# Patient Record
Sex: FEMALE | Race: WHITE | NOT HISPANIC OR LATINO | Employment: PART TIME | URBAN - METROPOLITAN AREA
[De-identification: names, ages, dates, MRNs, and addresses within clinical notes are randomized per-mention and may not be internally consistent; named-entity substitution may affect disease eponyms.]

---

## 2017-04-24 ENCOUNTER — THERAPY VISIT (OUTPATIENT)
Dept: PHYSICAL THERAPY | Facility: CLINIC | Age: 47
End: 2017-04-24
Payer: COMMERCIAL

## 2017-04-24 DIAGNOSIS — M25.511 ACUTE PAIN OF RIGHT SHOULDER: Primary | ICD-10-CM

## 2017-04-24 PROCEDURE — 97112 NEUROMUSCULAR REEDUCATION: CPT | Mod: GP

## 2017-04-24 PROCEDURE — 97110 THERAPEUTIC EXERCISES: CPT | Mod: GP

## 2017-04-24 PROCEDURE — 97161 PT EVAL LOW COMPLEX 20 MIN: CPT | Mod: GP

## 2017-04-24 NOTE — MR AVS SNAPSHOT
"              After Visit Summary   4/24/2017    Chanelle Allen    MRN: [id number]           Patient Information     Date Of Birth          1970        Visit Information        Provider Department      4/24/2017 3:40 PM Bryan Bloom PT [facility] [place] [facility]        Today's Diagnoses     Acute pain of right shoulder    -  1       Follow-ups after your visit        Your next 10 appointments already scheduled     May 01, 2017  2:20 PM CDT   OLIVA Extremity with Bryan Bloom PT   [facility] ([facility] [place])    [address] [place] [address]  [address]  [place] [id number] [phone]              Who to contact     If you have questions or need follow up information about today's clinic visit or your schedule please contact [facility] directly at [phone].  Normal or non-critical lab and imaging results will be communicated to you by TapTrakhart, letter or phone within 4 business days after the clinic has received the results. If you do not hear from us within 7 days, please contact the clinic through TapTrakhart or phone. If you have a critical or abnormal lab result, we will notify you by phone as soon as possible.  Submit refill requests through [employer] or call your pharmacy and they will forward the refill request to us. Please allow 3 business days for your refill to be completed.          Additional Information About Your Visit        TapTrakhart Information     [employer] lets you send messages to your doctor, view your test results, renew your prescriptions, schedule appointments and more. To sign up, go to [email or web address] . Click on \"Log in\" on the left side of the screen, which will take you to the Welcome page. Then click on \"Sign up Now\" on the right side of the page.     You will be asked to enter the access code listed below, as well as some personal information. " Please follow the directions to create your username and password.     Your access code is: [email or web address]  Expires: 2017  5:09 PM     Your access code will  in 90 days. If you need help or a new code, please call your [place] clinic or [phone].        Care EveryWhere ID     This is your Care EveryWhere ID. This could be used by other organizations to access your [place] medical records  [id number]         Blood Pressure from Last 3 Encounters:   No data found for BP    Weight from Last 3 Encounters:   No data found for Wt              We Performed the Following     HC PT EVAL, LOW COMPLEXITY     OLIVA INITIAL EVAL REPORT     NEUROMUSCULAR RE-EDUCATION     THERAPEUTIC EXERCISES        Primary Care Provider Office Phone # Fax #    Bahman Duran [phone]       [place] [address] [address] [facility] [address]  [facility] [address]        Thank you!     Thank you for choosing [facility]  for your care. Our goal is always to provide you with excellent care. Hearing back from our patients is one way we can continue to improve our services. Please take a few minutes to complete the written survey that you may receive in the mail after your visit with us. Thank you!             Your Updated Medication List - Protect others around you: Learn how to safely use, store and throw away your medicines at www.disposemymeds.org.      Notice  As of 2017  5:09 PM    You have not been prescribed any medications.

## 2017-04-24 NOTE — LETTER
[facility] [place] [address] [facility]  [address]  [facility] [address] [phone]    2017    Re: Chanelle Allen   :  1970  MRN:  [id number]   REFERRING PHYSICIAN:   REHAN Martin    [facility]    Date of Initial Evaluation:  17  Visits:  Rxs Used: 1  Reason for Referral:  Acute pain of right shoulder    EVALUATION SUMMARY    Subjective:  Patient is a 47 year old female presenting with rehab right shoulder hpi.   Chanelle Allen is a 47 year old female with a right shoulder condition.  Condition occurred with:  Other.  Condition occurred: other.  This is a new condition  Onset: years ago- thinks it might be due to wear and tear. Patient reports pain:  In the joint.  Radiates to:  Upper arm, elbow and lower arm.  Pain is described as sharp, shooting, stabbing and aching and is constant and reported as 6/10.  Associated symptoms:  Loss of motion/stiffness and loss of strength. Worse during: time of day does not affect sx.  Symptoms are exacerbated by certain positions, lying on extremity, using arm overhead, using arm at shoulder level, lifting and carrying and relieved by nothing.  Since onset symptoms are gradually worsening.  Special tests:  MRI.  Pertinent medical history includes:  Osteoarthritis, smoking and menopausal (Past smoker, numbness/tingling, pain at night/rest, weakness).  Medical allergies: yes (Latex, sulfa, penicillan).  Other surgeries include:  Orthopedic surgery (2 rt ACL 2012, 1L ACL ).  Current medications:  Muscle relaxants and pain medication (spironolactin).  Current occupation is Lunch lady.  Primary job tasks include:  Prolonged standing, lifting and repetitive tasks (Pusing/pulling).              Objective:  Standing Alignment:    Shoulder/UE:  Rounded shoulders  Flexibility/Screens:   Negative screens: Cervical      Shoulder  Evaluation:  ROM:  AROM:    Flexion:  Right:  150  Abduction:  Right:  125  Internal Rotation:  Right:  40  External Rotation:  Right:  100    Re: Chanelle Allen   :  1970    Strength:    Flexion: Right: 4-/5     Pain:   Abduction:  Right: 4+/5     Pain:  Internal Rotation:  Right: 4+/5     Pain:  External Rotation:   Right:3+/5     Pain:    Special Tests:  Special tests assessed shoulder: Inconclusive RCT.  Right shoulder positive for the following special tests:Impingement  Palpation:    Right shoulder tenderness present at: Supraspinatus  Mobility Tests:    Scapulohumeral rhythm right:  Hypomobile     General   ROS    Assessment/Plan:    Patient is a 47 year old female with right side shoulder complaints.    Patient has the following significant findings with corresponding treatment plan.                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    Comorbidity factors that impact the plan of care are:      None.     Medications impacting care: Muscle relaxant.  2) Examination of Body Systems comprised of:   Body structures and functions that impact the plan of care:      Shoulder.   Activity limitations that impact the plan of care are:      Cooking, Dressing, Grasping and reaching .  3) Clinical presentation characteristics are:   Stable/Uncomplicated.  4) Decision-Making    Low complexity using standardized patient assessment instrument and/or                              measureable assessment of functional outcome.  Cumulative Therapy Evaluation is: Low  complexity.    Previous and current functional limitations:  (See Goal Flow Sheet for this information)    Short term and Long term goals: (See Goal Flow Sheet for this information)   Communication ability:      Re: Chanelle Allen   :  1970    Treatment Explanation - The following has been discussed with the patient:   RX ordered/plan of care  Anticipated outcomes  Possible risks and side effects  This patient would benefit from PT intervention to resume normal activities.   Rehab potential is good.  Frequency:  1 X week, once daily  Duration:  for 6-8 weeks  Discharge Plan:  Achieve all LTG.  Independent in home treatment program.  Reach maximal therapeutic benefit.    Thank you for your referral.    INQUIRIES  Therapist: Bryan Bloom, PT   [facility]  [address] [place] [address]  [address]  [place] [address]  Phone: [phone]  Fax: [phone]

## 2017-04-24 NOTE — PROGRESS NOTES
Subjective:    Patient is a 47 year old female presenting with rehab right shoulder hpi.   Chanelle Allen is a 47 year old female with a right shoulder condition.  Condition occurred with:  Other.  Condition occurred: other.  This is a new condition  Onset: years ago- thinks it might be due to wear and tear. .    Patient reports pain:  In the joint.  Radiates to:  Upper arm, elbow and lower arm.  Pain is described as sharp, shooting, stabbing and aching and is constant and reported as 6/10.  Associated symptoms:  Loss of motion/stiffness and loss of strength. Worse during: time of day does not affect sx.  Symptoms are exacerbated by certain positions, lying on extremity, using arm overhead, using arm at shoulder level, lifting and carrying and relieved by nothing.  Since onset symptoms are gradually worsening.  Special tests:  MRI.                                                    Objective:    Standing Alignment:      Shoulder/UE:  Rounded shoulders                  Flexibility/Screens:   Negative screens: Cervical                              Shoulder Evaluation:  ROM:  AROM:    Flexion:  Right:  150    Abduction:  Right:  125    Internal Rotation:  Right:  40  External Rotation:  Right:  100                      Strength:    Flexion: Right: 4-/5     Pain:     Abduction:  Right: 4+/5     Pain:    Internal Rotation:  Right: 4+/5     Pain:  External Rotation:   Right:3+/5     Pain:              Special Tests:  Special tests assessed shoulder: Inconclusive RCT.    Right shoulder positive for the following special tests:Impingement  Palpation:      Right shoulder tenderness present at: Supraspinatus  Mobility Tests:                Scapulohumeral rhythm right:  Hypomobile                                   General     ROS    Assessment/Plan:      Patient is a 47 year old female with right side shoulder complaints.    Patient has the following significant findings with corresponding treatment plan.                 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    Comorbidity factors that impact the plan of care are:      None.     Medications impacting care: Muscle relaxant.  2) Examination of Body Systems comprised of:   Body structures and functions that impact the plan of care:      Shoulder.   Activity limitations that impact the plan of care are:      Cooking, Dressing, Grasping and reaching .  3) Clinical presentation characteristics are:   Stable/Uncomplicated.  4) Decision-Making    Low complexity using standardized patient assessment instrument and/or measureable assessment of functional outcome.  Cumulative Therapy Evaluation is: Low complexity.    Previous and current functional limitations:  (See Goal Flow Sheet for this information)    Short term and Long term goals: (See Goal Flow Sheet for this information)     Communication ability:      Treatment Explanation - The following has been discussed with the patient:   RX ordered/plan of care  Anticipated outcomes  Possible risks and side effects  This patient would benefit from PT intervention to resume normal activities.   Rehab potential is good.    Frequency:  1 X week, once daily  Duration:  for 6-8 weeks  Discharge Plan:  Achieve all LTG.  Independent in home treatment program.  Reach maximal therapeutic benefit.    Please refer to the daily flowsheet for treatment today, total treatment time and time spent performing 1:1 timed codes.

## 2017-04-25 NOTE — PROGRESS NOTES
Subjective:    Patient is a 47 year old female presenting with rehab left ankle/foot hpi.                                      Pertinent medical history includes:  Osteoarthritis, smoking and menopausal (Past smoker, numbness/tingling, pain at night/rest, weakness).  Medical allergies: yes (Latex, sulfa, penicillan).  Other surgeries include:  Orthopedic surgery (2 rt ACL 2012 1999, 1L ACL 1997).  Current medications:  Muscle relaxants and pain medication (spironolactin).  Current occupation is Lunch lady  .    Primary job tasks include:  Prolonged standing, lifting and repetitive tasks (Pusing/pulling).                                Objective:    System    Physical Exam    General     ROS    Assessment/Plan:

## 2017-05-01 ENCOUNTER — THERAPY VISIT (OUTPATIENT)
Dept: PHYSICAL THERAPY | Facility: CLINIC | Age: 47
End: 2017-05-01
Payer: COMMERCIAL

## 2017-05-01 DIAGNOSIS — M25.511 ACUTE PAIN OF RIGHT SHOULDER: ICD-10-CM

## 2017-05-01 PROCEDURE — 97112 NEUROMUSCULAR REEDUCATION: CPT | Mod: GP

## 2017-05-01 PROCEDURE — 97110 THERAPEUTIC EXERCISES: CPT | Mod: GP

## 2017-06-15 ENCOUNTER — TRANSFERRED RECORDS (OUTPATIENT)
Dept: HEALTH INFORMATION MANAGEMENT | Facility: CLINIC | Age: 47
End: 2017-06-15

## 2017-06-20 RX ORDER — SPIRONOLACTONE 50 MG/1
100 TABLET, FILM COATED ORAL EVERY MORNING
COMMUNITY

## 2017-06-23 ENCOUNTER — HOSPITAL ENCOUNTER (INPATIENT)
Facility: CLINIC | Age: 47
LOS: 2 days | Discharge: HOME | DRG: 470 | End: 2017-06-25
Attending: ORTHOPAEDIC SURGERY | Admitting: ORTHOPAEDIC SURGERY
Payer: COMMERCIAL

## 2017-06-23 ENCOUNTER — APPOINTMENT (OUTPATIENT)
Dept: PHYSICAL THERAPY | Facility: CLINIC | Age: 47
DRG: 470 | End: 2017-06-23
Attending: ORTHOPAEDIC SURGERY
Payer: COMMERCIAL

## 2017-06-23 ENCOUNTER — ANESTHESIA EVENT (OUTPATIENT)
Dept: SURGERY | Facility: CLINIC | Age: 47
DRG: 470 | End: 2017-06-23
Payer: COMMERCIAL

## 2017-06-23 ENCOUNTER — ANESTHESIA (OUTPATIENT)
Dept: SURGERY | Facility: CLINIC | Age: 47
DRG: 470 | End: 2017-06-23
Payer: COMMERCIAL

## 2017-06-23 ENCOUNTER — APPOINTMENT (OUTPATIENT)
Dept: GENERAL RADIOLOGY | Facility: CLINIC | Age: 47
DRG: 470 | End: 2017-06-23
Attending: ORTHOPAEDIC SURGERY
Payer: COMMERCIAL

## 2017-06-23 DIAGNOSIS — M17.32 POST-TRAUMATIC OSTEOARTHRITIS OF LEFT KNEE: Primary | ICD-10-CM

## 2017-06-23 PROBLEM — M17.12 LEFT KNEE DJD: Status: ACTIVE | Noted: 2017-06-23

## 2017-06-23 PROCEDURE — 25000132 ZZH RX MED GY IP 250 OP 250 PS 637: Performed by: ORTHOPAEDIC SURGERY

## 2017-06-23 PROCEDURE — 27210794 ZZH OR GENERAL SUPPLY STERILE: Performed by: ORTHOPAEDIC SURGERY

## 2017-06-23 PROCEDURE — 27211024 ZZHC OR SUPPLY OTHER OPNP: Performed by: ORTHOPAEDIC SURGERY

## 2017-06-23 PROCEDURE — C1776 JOINT DEVICE (IMPLANTABLE): HCPCS | Performed by: ORTHOPAEDIC SURGERY

## 2017-06-23 PROCEDURE — 25800025 ZZH RX 258: Performed by: ORTHOPAEDIC SURGERY

## 2017-06-23 PROCEDURE — 27810169 ZZH OR IMPLANT GENERAL: Performed by: ORTHOPAEDIC SURGERY

## 2017-06-23 PROCEDURE — 25000128 H RX IP 250 OP 636: Performed by: PHYSICIAN ASSISTANT

## 2017-06-23 PROCEDURE — 97161 PT EVAL LOW COMPLEX 20 MIN: CPT | Mod: GP | Performed by: PHYSICAL THERAPIST

## 2017-06-23 PROCEDURE — 0MBP0ZZ EXCISION OF LEFT KNEE BURSA AND LIGAMENT, OPEN APPROACH: ICD-10-PCS | Performed by: ORTHOPAEDIC SURGERY

## 2017-06-23 PROCEDURE — 0QPC04Z REMOVAL OF INTERNAL FIXATION DEVICE FROM LEFT LOWER FEMUR, OPEN APPROACH: ICD-10-PCS | Performed by: ORTHOPAEDIC SURGERY

## 2017-06-23 PROCEDURE — 25000128 H RX IP 250 OP 636: Performed by: NURSE ANESTHETIST, CERTIFIED REGISTERED

## 2017-06-23 PROCEDURE — 25000132 ZZH RX MED GY IP 250 OP 250 PS 637: Performed by: PHYSICIAN ASSISTANT

## 2017-06-23 PROCEDURE — 71000015 ZZH RECOVERY PHASE 1 LEVEL 2 EA ADDTL HR: Performed by: ORTHOPAEDIC SURGERY

## 2017-06-23 PROCEDURE — 97110 THERAPEUTIC EXERCISES: CPT | Mod: GP | Performed by: PHYSICAL THERAPIST

## 2017-06-23 PROCEDURE — 97530 THERAPEUTIC ACTIVITIES: CPT | Mod: GP | Performed by: PHYSICAL THERAPIST

## 2017-06-23 PROCEDURE — 0SRD0J9 REPLACEMENT OF LEFT KNEE JOINT WITH SYNTHETIC SUBSTITUTE, CEMENTED, OPEN APPROACH: ICD-10-PCS | Performed by: ORTHOPAEDIC SURGERY

## 2017-06-23 PROCEDURE — 25000128 H RX IP 250 OP 636: Performed by: ORTHOPAEDIC SURGERY

## 2017-06-23 PROCEDURE — 25000125 ZZHC RX 250: Performed by: ANESTHESIOLOGY

## 2017-06-23 PROCEDURE — 27210995 ZZH RX 272: Performed by: ORTHOPAEDIC SURGERY

## 2017-06-23 PROCEDURE — S0077 INJECTION, CLINDAMYCIN PHOSP: HCPCS | Performed by: PHYSICIAN ASSISTANT

## 2017-06-23 PROCEDURE — 36000063 ZZH SURGERY LEVEL 4 EA 15 ADDTL MIN: Performed by: ORTHOPAEDIC SURGERY

## 2017-06-23 PROCEDURE — 40000193 ZZH STATISTIC PT WARD VISIT: Performed by: PHYSICAL THERAPIST

## 2017-06-23 PROCEDURE — 36000093 ZZH SURGERY LEVEL 4 1ST 30 MIN: Performed by: ORTHOPAEDIC SURGERY

## 2017-06-23 PROCEDURE — 25000125 ZZHC RX 250: Performed by: NURSE ANESTHETIST, CERTIFIED REGISTERED

## 2017-06-23 PROCEDURE — 12000007 ZZH R&B INTERMEDIATE

## 2017-06-23 PROCEDURE — 25000566 ZZH SEVOFLURANE, EA 15 MIN: Performed by: ORTHOPAEDIC SURGERY

## 2017-06-23 PROCEDURE — 99207 ZZC NON-BILLABLE SERV PER CHARTING: CPT | Performed by: PHYSICIAN ASSISTANT

## 2017-06-23 PROCEDURE — 37000009 ZZH ANESTHESIA TECHNICAL FEE, EACH ADDTL 15 MIN: Performed by: ORTHOPAEDIC SURGERY

## 2017-06-23 PROCEDURE — 27110028 ZZH OR GENERAL SUPPLY NON-STERILE: Performed by: ORTHOPAEDIC SURGERY

## 2017-06-23 PROCEDURE — 25000125 ZZHC RX 250: Performed by: ORTHOPAEDIC SURGERY

## 2017-06-23 PROCEDURE — 25000128 H RX IP 250 OP 636: Performed by: ANESTHESIOLOGY

## 2017-06-23 PROCEDURE — 37000008 ZZH ANESTHESIA TECHNICAL FEE, 1ST 30 MIN: Performed by: ORTHOPAEDIC SURGERY

## 2017-06-23 PROCEDURE — 0QPH04Z REMOVAL OF INTERNAL FIXATION DEVICE FROM LEFT TIBIA, OPEN APPROACH: ICD-10-PCS | Performed by: ORTHOPAEDIC SURGERY

## 2017-06-23 PROCEDURE — 40000986 XR KNEE PORT LT 1/2 VW: Mod: LT

## 2017-06-23 PROCEDURE — 40000170 ZZH STATISTIC PRE-PROCEDURE ASSESSMENT II: Performed by: ORTHOPAEDIC SURGERY

## 2017-06-23 PROCEDURE — S0077 INJECTION, CLINDAMYCIN PHOSP: HCPCS | Performed by: ORTHOPAEDIC SURGERY

## 2017-06-23 PROCEDURE — 71000014 ZZH RECOVERY PHASE 1 LEVEL 2 FIRST HR: Performed by: ORTHOPAEDIC SURGERY

## 2017-06-23 PROCEDURE — 25000125 ZZHC RX 250: Performed by: PHYSICIAN ASSISTANT

## 2017-06-23 DEVICE — IMPLANTABLE DEVICE: Type: IMPLANTABLE DEVICE | Site: KNEE | Status: FUNCTIONAL

## 2017-06-23 DEVICE — BONE CEMENT SIMPLEX FULL DOSE 6191-1-001: Type: IMPLANTABLE DEVICE | Site: KNEE | Status: FUNCTIONAL

## 2017-06-23 DEVICE — IMP BASEPLATE TIBIAL GENESIS II SZ 4 LT TI 71420166: Type: IMPLANTABLE DEVICE | Site: KNEE | Status: FUNCTIONAL

## 2017-06-23 DEVICE — IMP INSERT TIB S&N LGN PS HI FLEX XLPE SZ3-4 9MM 71453211: Type: IMPLANTABLE DEVICE | Site: KNEE | Status: FUNCTIONAL

## 2017-06-23 RX ORDER — ONDANSETRON 4 MG/1
4 TABLET, ORALLY DISINTEGRATING ORAL EVERY 30 MIN PRN
Status: DISCONTINUED | OUTPATIENT
Start: 2017-06-23 | End: 2017-06-23 | Stop reason: HOSPADM

## 2017-06-23 RX ORDER — ROPIVACAINE HYDROCHLORIDE 5 MG/ML
INJECTION, SOLUTION EPIDURAL; INFILTRATION; PERINEURAL PRN
Status: DISCONTINUED | OUTPATIENT
Start: 2017-06-23 | End: 2017-06-23

## 2017-06-23 RX ORDER — MEPERIDINE HYDROCHLORIDE 25 MG/ML
12.5 INJECTION INTRAMUSCULAR; INTRAVENOUS; SUBCUTANEOUS EVERY 5 MIN PRN
Status: DISCONTINUED | OUTPATIENT
Start: 2017-06-23 | End: 2017-06-23 | Stop reason: HOSPADM

## 2017-06-23 RX ORDER — AMOXICILLIN 250 MG
1-2 CAPSULE ORAL 2 TIMES DAILY
Status: DISCONTINUED | OUTPATIENT
Start: 2017-06-23 | End: 2017-06-25 | Stop reason: HOSPADM

## 2017-06-23 RX ORDER — ACETAMINOPHEN 500 MG
1000 TABLET ORAL
Status: COMPLETED | OUTPATIENT
Start: 2017-06-23 | End: 2017-06-23

## 2017-06-23 RX ORDER — KETOROLAC TROMETHAMINE 30 MG/ML
INJECTION, SOLUTION INTRAMUSCULAR; INTRAVENOUS PRN
Status: DISCONTINUED | OUTPATIENT
Start: 2017-06-23 | End: 2017-06-23 | Stop reason: HOSPADM

## 2017-06-23 RX ORDER — HYDROXYZINE HYDROCHLORIDE 50 MG/ML
50 INJECTION, SOLUTION INTRAMUSCULAR ONCE
Status: DISCONTINUED | OUTPATIENT
Start: 2017-06-23 | End: 2017-06-23

## 2017-06-23 RX ORDER — HYDROXYZINE HYDROCHLORIDE 25 MG/ML
25 INJECTION, SOLUTION INTRAMUSCULAR ONCE
Status: DISCONTINUED | OUTPATIENT
Start: 2017-06-23 | End: 2017-06-23

## 2017-06-23 RX ORDER — ALBUTEROL SULFATE 0.83 MG/ML
2.5 SOLUTION RESPIRATORY (INHALATION) EVERY 4 HOURS PRN
Status: DISCONTINUED | OUTPATIENT
Start: 2017-06-23 | End: 2017-06-23 | Stop reason: HOSPADM

## 2017-06-23 RX ORDER — BUPIVACAINE HYDROCHLORIDE AND EPINEPHRINE 2.5; 5 MG/ML; UG/ML
INJECTION, SOLUTION INFILTRATION; PERINEURAL PRN
Status: DISCONTINUED | OUTPATIENT
Start: 2017-06-23 | End: 2017-06-23 | Stop reason: HOSPADM

## 2017-06-23 RX ORDER — ONDANSETRON 2 MG/ML
4 INJECTION INTRAMUSCULAR; INTRAVENOUS EVERY 6 HOURS PRN
Status: DISCONTINUED | OUTPATIENT
Start: 2017-06-23 | End: 2017-06-25 | Stop reason: HOSPADM

## 2017-06-23 RX ORDER — HYDROXYZINE HYDROCHLORIDE 25 MG/ML
50 INJECTION, SOLUTION INTRAMUSCULAR ONCE
Status: DISCONTINUED | OUTPATIENT
Start: 2017-06-23 | End: 2017-06-23 | Stop reason: CLARIF

## 2017-06-23 RX ORDER — ONDANSETRON 2 MG/ML
4 INJECTION INTRAMUSCULAR; INTRAVENOUS EVERY 30 MIN PRN
Status: DISCONTINUED | OUTPATIENT
Start: 2017-06-23 | End: 2017-06-23 | Stop reason: HOSPADM

## 2017-06-23 RX ORDER — MORPHINE SULFATE 1 MG/ML
INJECTION, SOLUTION EPIDURAL; INTRATHECAL; INTRAVENOUS PRN
Status: DISCONTINUED | OUTPATIENT
Start: 2017-06-23 | End: 2017-06-23 | Stop reason: HOSPADM

## 2017-06-23 RX ORDER — OXYCODONE HCL 10 MG/1
10 TABLET, FILM COATED, EXTENDED RELEASE ORAL EVERY 12 HOURS
Status: COMPLETED | OUTPATIENT
Start: 2017-06-23 | End: 2017-06-25

## 2017-06-23 RX ORDER — CLINDAMYCIN PHOSPHATE 900 MG/50ML
900 INJECTION, SOLUTION INTRAVENOUS
Status: COMPLETED | OUTPATIENT
Start: 2017-06-23 | End: 2017-06-23

## 2017-06-23 RX ORDER — DIAZEPAM 5 MG
5 TABLET ORAL EVERY 6 HOURS PRN
Status: DISCONTINUED | OUTPATIENT
Start: 2017-06-23 | End: 2017-06-25 | Stop reason: HOSPADM

## 2017-06-23 RX ORDER — CHLORAL HYDRATE 500 MG
1000 CAPSULE ORAL DAILY
COMMUNITY

## 2017-06-23 RX ORDER — KETOROLAC TROMETHAMINE 30 MG/ML
INJECTION, SOLUTION INTRAMUSCULAR; INTRAVENOUS PRN
Status: DISCONTINUED | OUTPATIENT
Start: 2017-06-23 | End: 2017-06-23

## 2017-06-23 RX ORDER — LIDOCAINE HYDROCHLORIDE 20 MG/ML
INJECTION, SOLUTION INFILTRATION; PERINEURAL PRN
Status: DISCONTINUED | OUTPATIENT
Start: 2017-06-23 | End: 2017-06-23

## 2017-06-23 RX ORDER — DEXAMETHASONE SODIUM PHOSPHATE 4 MG/ML
INJECTION, SOLUTION INTRA-ARTICULAR; INTRALESIONAL; INTRAMUSCULAR; INTRAVENOUS; SOFT TISSUE PRN
Status: DISCONTINUED | OUTPATIENT
Start: 2017-06-23 | End: 2017-06-23

## 2017-06-23 RX ORDER — DIPHENHYDRAMINE HYDROCHLORIDE 50 MG/ML
25 INJECTION INTRAMUSCULAR; INTRAVENOUS EVERY 6 HOURS PRN
Status: DISCONTINUED | OUTPATIENT
Start: 2017-06-23 | End: 2017-06-25 | Stop reason: HOSPADM

## 2017-06-23 RX ORDER — ASPIRIN 81 MG/1
162 TABLET ORAL DAILY
Status: DISCONTINUED | OUTPATIENT
Start: 2017-06-23 | End: 2017-06-25 | Stop reason: HOSPADM

## 2017-06-23 RX ORDER — PROPOFOL 10 MG/ML
INJECTION, EMULSION INTRAVENOUS PRN
Status: DISCONTINUED | OUTPATIENT
Start: 2017-06-23 | End: 2017-06-23

## 2017-06-23 RX ORDER — OXYCODONE HYDROCHLORIDE 5 MG/1
5-10 TABLET ORAL
Status: DISCONTINUED | OUTPATIENT
Start: 2017-06-23 | End: 2017-06-25 | Stop reason: HOSPADM

## 2017-06-23 RX ORDER — FENTANYL CITRATE 50 UG/ML
INJECTION, SOLUTION INTRAMUSCULAR; INTRAVENOUS PRN
Status: DISCONTINUED | OUTPATIENT
Start: 2017-06-23 | End: 2017-06-23

## 2017-06-23 RX ORDER — FENTANYL CITRATE 0.05 MG/ML
25-50 INJECTION, SOLUTION INTRAMUSCULAR; INTRAVENOUS
Status: DISCONTINUED | OUTPATIENT
Start: 2017-06-23 | End: 2017-06-23 | Stop reason: HOSPADM

## 2017-06-23 RX ORDER — SODIUM CHLORIDE, SODIUM LACTATE, POTASSIUM CHLORIDE, CALCIUM CHLORIDE 600; 310; 30; 20 MG/100ML; MG/100ML; MG/100ML; MG/100ML
INJECTION, SOLUTION INTRAVENOUS CONTINUOUS
Status: DISCONTINUED | OUTPATIENT
Start: 2017-06-23 | End: 2017-06-23 | Stop reason: HOSPADM

## 2017-06-23 RX ORDER — FENTANYL CITRATE 50 UG/ML
100 INJECTION, SOLUTION INTRAMUSCULAR; INTRAVENOUS ONCE
Status: COMPLETED | OUTPATIENT
Start: 2017-06-23 | End: 2017-06-23

## 2017-06-23 RX ORDER — ACETAMINOPHEN 325 MG/1
975 TABLET ORAL EVERY 8 HOURS
Status: DISCONTINUED | OUTPATIENT
Start: 2017-06-23 | End: 2017-06-25 | Stop reason: HOSPADM

## 2017-06-23 RX ORDER — MAGNESIUM HYDROXIDE 1200 MG/15ML
LIQUID ORAL PRN
Status: DISCONTINUED | OUTPATIENT
Start: 2017-06-23 | End: 2017-06-23 | Stop reason: HOSPADM

## 2017-06-23 RX ORDER — LIDOCAINE 40 MG/G
CREAM TOPICAL
Status: DISCONTINUED | OUTPATIENT
Start: 2017-06-23 | End: 2017-06-25 | Stop reason: HOSPADM

## 2017-06-23 RX ORDER — HYDROMORPHONE HYDROCHLORIDE 1 MG/ML
.3-.5 INJECTION, SOLUTION INTRAMUSCULAR; INTRAVENOUS; SUBCUTANEOUS
Status: DISCONTINUED | OUTPATIENT
Start: 2017-06-23 | End: 2017-06-25 | Stop reason: HOSPADM

## 2017-06-23 RX ORDER — PROPOFOL 10 MG/ML
INJECTION, EMULSION INTRAVENOUS CONTINUOUS PRN
Status: DISCONTINUED | OUTPATIENT
Start: 2017-06-23 | End: 2017-06-23

## 2017-06-23 RX ORDER — CLINDAMYCIN PHOSPHATE 900 MG/50ML
900 INJECTION, SOLUTION INTRAVENOUS EVERY 8 HOURS
Status: COMPLETED | OUTPATIENT
Start: 2017-06-23 | End: 2017-06-24

## 2017-06-23 RX ORDER — ONDANSETRON 2 MG/ML
INJECTION INTRAMUSCULAR; INTRAVENOUS PRN
Status: DISCONTINUED | OUTPATIENT
Start: 2017-06-23 | End: 2017-06-23

## 2017-06-23 RX ORDER — TRETINOIN 0.5 MG/G
CREAM TOPICAL
COMMUNITY

## 2017-06-23 RX ORDER — EPHEDRINE SULFATE 50 MG/ML
INJECTION, SOLUTION INTRAMUSCULAR; INTRAVENOUS; SUBCUTANEOUS PRN
Status: DISCONTINUED | OUTPATIENT
Start: 2017-06-23 | End: 2017-06-23

## 2017-06-23 RX ORDER — DIPHENHYDRAMINE HCL 25 MG
25 CAPSULE ORAL EVERY 6 HOURS PRN
Status: DISCONTINUED | OUTPATIENT
Start: 2017-06-23 | End: 2017-06-25 | Stop reason: HOSPADM

## 2017-06-23 RX ORDER — HYDROXYZINE HYDROCHLORIDE 50 MG/ML
25 INJECTION, SOLUTION INTRAMUSCULAR ONCE
Status: COMPLETED | OUTPATIENT
Start: 2017-06-23 | End: 2017-06-23

## 2017-06-23 RX ORDER — NALOXONE HYDROCHLORIDE 0.4 MG/ML
.1-.4 INJECTION, SOLUTION INTRAMUSCULAR; INTRAVENOUS; SUBCUTANEOUS
Status: DISCONTINUED | OUTPATIENT
Start: 2017-06-23 | End: 2017-06-25 | Stop reason: HOSPADM

## 2017-06-23 RX ORDER — ONDANSETRON 4 MG/1
4 TABLET, ORALLY DISINTEGRATING ORAL EVERY 6 HOURS PRN
Status: DISCONTINUED | OUTPATIENT
Start: 2017-06-23 | End: 2017-06-25 | Stop reason: HOSPADM

## 2017-06-23 RX ORDER — OXYCODONE HCL 10 MG/1
10 TABLET, FILM COATED, EXTENDED RELEASE ORAL
Status: COMPLETED | OUTPATIENT
Start: 2017-06-23 | End: 2017-06-23

## 2017-06-23 RX ORDER — DIPHENHYDRAMINE HYDROCHLORIDE 50 MG/ML
12.5 INJECTION INTRAMUSCULAR; INTRAVENOUS ONCE
Status: COMPLETED | OUTPATIENT
Start: 2017-06-23 | End: 2017-06-23

## 2017-06-23 RX ORDER — ACETAMINOPHEN 325 MG/1
650 TABLET ORAL EVERY 4 HOURS PRN
Status: DISCONTINUED | OUTPATIENT
Start: 2017-06-26 | End: 2017-06-25 | Stop reason: HOSPADM

## 2017-06-23 RX ADMIN — DIPHENHYDRAMINE HYDROCHLORIDE 12.5 MG: 50 INJECTION, SOLUTION INTRAMUSCULAR; INTRAVENOUS at 10:45

## 2017-06-23 RX ADMIN — FENTANYL CITRATE 100 MCG: 50 INJECTION, SOLUTION INTRAMUSCULAR; INTRAVENOUS at 07:45

## 2017-06-23 RX ADMIN — Medication 5 MG: at 07:39

## 2017-06-23 RX ADMIN — PROPOFOL 200 MCG/KG/MIN: 10 INJECTION, EMULSION INTRAVENOUS at 07:39

## 2017-06-23 RX ADMIN — HYDROMORPHONE HYDROCHLORIDE 0.5 MG: 1 INJECTION, SOLUTION INTRAMUSCULAR; INTRAVENOUS; SUBCUTANEOUS at 10:15

## 2017-06-23 RX ADMIN — TRANEXAMIC ACID 1 G: 100 INJECTION, SOLUTION INTRAVENOUS at 07:43

## 2017-06-23 RX ADMIN — PHENYLEPHRINE HYDROCHLORIDE 100 MCG: 10 INJECTION, SOLUTION INTRAMUSCULAR; INTRAVENOUS; SUBCUTANEOUS at 08:56

## 2017-06-23 RX ADMIN — HYDROMORPHONE HYDROCHLORIDE 0.5 MG: 1 INJECTION, SOLUTION INTRAMUSCULAR; INTRAVENOUS; SUBCUTANEOUS at 15:52

## 2017-06-23 RX ADMIN — OXYCODONE HYDROCHLORIDE 10 MG: 5 TABLET ORAL at 20:29

## 2017-06-23 RX ADMIN — PHENYLEPHRINE HYDROCHLORIDE 100 MCG: 10 INJECTION, SOLUTION INTRAMUSCULAR; INTRAVENOUS; SUBCUTANEOUS at 07:52

## 2017-06-23 RX ADMIN — SODIUM CHLORIDE, POTASSIUM CHLORIDE, SODIUM LACTATE AND CALCIUM CHLORIDE: 600; 310; 30; 20 INJECTION, SOLUTION INTRAVENOUS at 07:10

## 2017-06-23 RX ADMIN — OXYCODONE HYDROCHLORIDE 10 MG: 10 TABLET, FILM COATED, EXTENDED RELEASE ORAL at 18:11

## 2017-06-23 RX ADMIN — PROPOFOL 200 MG: 10 INJECTION, EMULSION INTRAVENOUS at 07:39

## 2017-06-23 RX ADMIN — ACETAMINOPHEN 975 MG: 325 TABLET, FILM COATED ORAL at 14:52

## 2017-06-23 RX ADMIN — FENTANYL CITRATE 50 MCG: 50 INJECTION, SOLUTION INTRAMUSCULAR; INTRAVENOUS at 10:03

## 2017-06-23 RX ADMIN — SODIUM CHLORIDE, POTASSIUM CHLORIDE, SODIUM LACTATE AND CALCIUM CHLORIDE: 600; 310; 30; 20 INJECTION, SOLUTION INTRAVENOUS at 07:51

## 2017-06-23 RX ADMIN — OXYCODONE HYDROCHLORIDE 10 MG: 10 TABLET, FILM COATED, EXTENDED RELEASE ORAL at 06:27

## 2017-06-23 RX ADMIN — PHENYLEPHRINE HYDROCHLORIDE 100 MCG: 10 INJECTION, SOLUTION INTRAMUSCULAR; INTRAVENOUS; SUBCUTANEOUS at 07:44

## 2017-06-23 RX ADMIN — TRANEXAMIC ACID 1 G: 100 INJECTION, SOLUTION INTRAVENOUS at 08:56

## 2017-06-23 RX ADMIN — DEXAMETHASONE SODIUM PHOSPHATE 4 MG: 4 INJECTION, SOLUTION INTRA-ARTICULAR; INTRALESIONAL; INTRAMUSCULAR; INTRAVENOUS; SOFT TISSUE at 07:44

## 2017-06-23 RX ADMIN — ROPIVACAINE HYDROCHLORIDE 20 ML: 5 INJECTION, SOLUTION EPIDURAL; INFILTRATION; PERINEURAL at 06:52

## 2017-06-23 RX ADMIN — ASPIRIN 162 MG: 81 TABLET, COATED ORAL at 14:52

## 2017-06-23 RX ADMIN — ACETAMINOPHEN 1000 MG: 500 TABLET, FILM COATED ORAL at 06:27

## 2017-06-23 RX ADMIN — DIPHENHYDRAMINE HYDROCHLORIDE 12.5 MG: 50 INJECTION, SOLUTION INTRAMUSCULAR; INTRAVENOUS at 10:34

## 2017-06-23 RX ADMIN — MIDAZOLAM HYDROCHLORIDE 2 MG: 1 INJECTION, SOLUTION INTRAMUSCULAR; INTRAVENOUS at 07:22

## 2017-06-23 RX ADMIN — SENNOSIDES AND DOCUSATE SODIUM 1 TABLET: 8.6; 5 TABLET ORAL at 20:29

## 2017-06-23 RX ADMIN — KETOROLAC TROMETHAMINE 30 MG: 30 INJECTION, SOLUTION INTRAMUSCULAR at 08:17

## 2017-06-23 RX ADMIN — SODIUM CHLORIDE, POTASSIUM CHLORIDE, SODIUM LACTATE AND CALCIUM CHLORIDE: 600; 310; 30; 20 INJECTION, SOLUTION INTRAVENOUS at 06:27

## 2017-06-23 RX ADMIN — CLINDAMYCIN PHOSPHATE 900 MG: 18 INJECTION, SOLUTION INTRAVENOUS at 07:40

## 2017-06-23 RX ADMIN — MIDAZOLAM HYDROCHLORIDE 2 MG: 1 INJECTION, SOLUTION INTRAMUSCULAR; INTRAVENOUS at 06:45

## 2017-06-23 RX ADMIN — HYDROMORPHONE HYDROCHLORIDE 0.5 MG: 1 INJECTION, SOLUTION INTRAMUSCULAR; INTRAVENOUS; SUBCUTANEOUS at 09:02

## 2017-06-23 RX ADMIN — PHENYLEPHRINE HYDROCHLORIDE 100 MCG: 10 INJECTION, SOLUTION INTRAMUSCULAR; INTRAVENOUS; SUBCUTANEOUS at 07:41

## 2017-06-23 RX ADMIN — SODIUM CHLORIDE 500 ML: 9 INJECTION, SOLUTION INTRAVENOUS at 14:40

## 2017-06-23 RX ADMIN — FENTANYL CITRATE 25 MCG: 50 INJECTION, SOLUTION INTRAMUSCULAR; INTRAVENOUS at 07:31

## 2017-06-23 RX ADMIN — HYDROXYZINE HYDROCHLORIDE 25 MG: 50 INJECTION, SOLUTION INTRAMUSCULAR at 11:56

## 2017-06-23 RX ADMIN — HYDROMORPHONE HYDROCHLORIDE 0.5 MG: 1 INJECTION, SOLUTION INTRAMUSCULAR; INTRAVENOUS; SUBCUTANEOUS at 07:56

## 2017-06-23 RX ADMIN — OXYCODONE HYDROCHLORIDE 5 MG: 5 TABLET ORAL at 17:26

## 2017-06-23 RX ADMIN — OXYCODONE HYDROCHLORIDE 10 MG: 5 TABLET ORAL at 23:26

## 2017-06-23 RX ADMIN — HYDROMORPHONE HYDROCHLORIDE 0.5 MG: 1 INJECTION, SOLUTION INTRAMUSCULAR; INTRAVENOUS; SUBCUTANEOUS at 09:52

## 2017-06-23 RX ADMIN — FENTANYL CITRATE 50 MCG: 50 INJECTION, SOLUTION INTRAMUSCULAR; INTRAVENOUS at 07:51

## 2017-06-23 RX ADMIN — LIDOCAINE HYDROCHLORIDE 40 MG: 20 INJECTION, SOLUTION INFILTRATION; PERINEURAL at 07:39

## 2017-06-23 RX ADMIN — SENNOSIDES AND DOCUSATE SODIUM 1 TABLET: 8.6; 5 TABLET ORAL at 14:51

## 2017-06-23 RX ADMIN — FENTANYL CITRATE 50 MCG: 50 INJECTION, SOLUTION INTRAMUSCULAR; INTRAVENOUS at 10:14

## 2017-06-23 RX ADMIN — ONDANSETRON 4 MG: 2 INJECTION INTRAMUSCULAR; INTRAVENOUS at 08:56

## 2017-06-23 RX ADMIN — ACETAMINOPHEN 975 MG: 325 TABLET, FILM COATED ORAL at 23:26

## 2017-06-23 RX ADMIN — CLINDAMYCIN PHOSPHATE 900 MG: 18 INJECTION, SOLUTION INTRAVENOUS at 17:26

## 2017-06-23 RX ADMIN — MIDAZOLAM HYDROCHLORIDE 2 MG: 1 INJECTION, SOLUTION INTRAMUSCULAR; INTRAVENOUS at 07:21

## 2017-06-23 RX ADMIN — FENTANYL CITRATE 25 MCG: 50 INJECTION, SOLUTION INTRAMUSCULAR; INTRAVENOUS at 07:34

## 2017-06-23 NOTE — PROGRESS NOTES
"Consulted by Tone Campbell PA-C for \"hospitalist consult\". Patient is a healthy 47 year old female who underwent LTKA for traumatic OA earlier today with Dr. Kemp.     I discussed case with nursing staff. No concerns at this time. Blood pressure a little soft at 98/44 but patient asymptomatic. Other vitals stable.    At this point in time I do not feel that the patient needs to be seen by the hospitalist service. I will give a one time IV fluid bolus of 500 cc. I recommend holding spironolactone for the time being and resuming this upon discharge given patient is taking this for acne and not hypertension.     Hospitalist Service will sign off.    Christiane Torres PA-C  Internal Medicine Hospitalist  "

## 2017-06-23 NOTE — PROVIDER NOTIFICATION
Dr Justo VAN, [place] was notified of pt not having any IV fluid ordered post op. PA said to SL. No order given.

## 2017-06-23 NOTE — IP AVS SNAPSHOT
MRN:[id number]                      After Visit Summary   6/23/2017    Chanelle Allen    MRN: [id number]           Thank you!     Thank you for choosing [facility] for your care. Our goal is always to provide you with excellent care. Hearing back from our patients is one way we can continue to improve our services. Please take a few minutes to complete the written survey that you may receive in the mail after you visit with us. Thank you!        Patient Information     Date Of Birth          1970        Designated Caregiver       Most Recent Value    Caregiver    Will someone help with your care after discharge? yes    Name of designated caregiver Faizan    Phone number of caregiver [phone]    Caregiver address [address] [address] [place]      About your hospital stay     You were admitted on:  June 23, 2017 You last received care in the:  [place] [address] [facility]    You were discharged on:  June 25, 2017        Reason for your hospital stay       Left total knee arthroplasty                  Who to Call     For medical emergencies, please call 911.  For non-urgent questions about your medical care, please call your primary care provider or clinic, [phone]  For questions related to your surgery, please call your surgery clinic        Attending Provider     Provider Specialty    Rick Kemp MD Orthopaedic Surgery       Primary Care Provider Office Phone # Fax #    Bahman Duran [phone]       When to contact your care team       Call Dr. Kemp's office if you have any of the following: temperature greater than 102 or increased drainage. [phone]                  After Care Instructions     Activity       Your activity upon discharge: activity as tolerated            Diet       Follow this diet upon discharge: Regular            Wound care and dressings       Instructions to care for your wound at home: keep wound clean and dry.  "Wrap knee in waterproof wrapping prior to shower. Keep bandage dry until follow up appointment in clinic.                  Pending Results     No orders found from 2017 to 2017.            Statement of Approval     Ordered          17 0628  I have reviewed and agree with all the recommendations and orders detailed in this document.  EFFECTIVE NOW     Approved and electronically signed by:  Tone Campbell PA-C             Admission Information     Date & Time Provider Department Dept. Phone    2017 Rick Kemp MD [place] [address] [facility] [phone]      Your Vitals Were     Blood Pressure Pulse Temperature Respirations Last Period Pulse Oximetry    121/73 (BP Location: Right arm) 93 98.4  F (36.9  C) (Oral) 16 2016 99%      MyChart Information     [employer] lets you send messages to your doctor, view your test results, renew your prescriptions, schedule appointments and more. To sign up, go to [email or web address] . Click on \"Log in\" on the left side of the screen, which will take you to the Welcome page. Then click on \"Sign up Now\" on the right side of the page.     You will be asked to enter the access code listed below, as well as some personal information. Please follow the directions to create your username and password.     Your access code is: [email or web address]  Expires: 2017  5:09 PM     Your access code will  in 90 days. If you need help or a new code, please call your [facility] clinic or [phone].        Care EveryWhere ID     This is your Care EveryWhere ID. This could be used by other organizations to access your [facility] medical records  [id number]        Equal Access to Services     [facility] : Hadii kathryn Barger, warosada luagathaadaha, qaybta kaalmada yasmani, ruy resendiz. So [facility] [phone].    ATENCIÓN: Si habla español, tiene a betancourt disposición servicios gratuitos de asistencia lingüística. " Katarina rider [phone].    We comply with applicable federal civil rights laws and [place] laws. We do not discriminate on the basis of race, color, national origin, age, disability sex, sexual orientation or gender identity.               Review of your medicines      START taking        Dose / Directions    aspirin 81 MG EC tablet        Dose:  162 mg   Take 2 tablets (162 mg) by mouth daily   Quantity:  120 tablet   Refills:  0       diazepam 5 MG tablet   Commonly known as:  VALIUM        Dose:  5 mg   Take 1 tablet (5 mg) by mouth every 6 hours as needed for other (muscle spasms)   Quantity:  60 tablet   Refills:  0       * oxyCODONE 5 MG IR tablet   Commonly known as:  ROXICODONE        Dose:  5-10 mg   Take 1-2 tablets (5-10 mg) by mouth every 3 hours as needed for moderate to severe pain   Quantity:  90 tablet   Refills:  0       * oxyCODONE 10 MG 12 hr tablet   Commonly known as:  OxyCONTIN        Dose:  10 mg   Take 1 tablet (10 mg) by mouth every 12 hours maximum 2 tablet(s) per day   Quantity:  60 tablet   Refills:  0       senna-docusate 8.6-50 MG per tablet   Commonly known as:  SENOKOT-S;PERICOLACE        Dose:  1-2 tablet   Take 1-2 tablets by mouth 2 times daily   Quantity:  100 tablet   Refills:  0       * Notice:  This list has 2 medication(s) that are the same as other medications prescribed for you. Read the directions carefully, and ask your doctor or other care provider to review them with you.      CONTINUE these medicines which have NOT CHANGED        Dose / Directions    BIOTIN PO        Dose:  1 tablet   Take 1 tablet by mouth daily   Refills:  0       calcium-vitamin D 600-400 MG-UNIT per tablet   Commonly known as:  CALTRATE        Dose:  1 tablet   Take 1 tablet by mouth daily   Refills:  0       FISH OIL PO        Dose:  1000 mg   Take 1,000 mg by mouth daily   Refills:  0       * SPIRONOLACTONE PO        Dose:  100 mg   Take 100 mg by mouth every morning (Takes 2 x 50mg tablet =  100mg dose)   Refills:  0       * SPIRONOLACTONE PO        Dose:  50 mg   Take 50 mg by mouth every evening   Refills:  0       tretinoin 0.05 % cream   Commonly known as:  RETIN-A        Apply topically nightly as needed   Refills:  0       TYLENOL PO        Dose:  8033-5407 mg   Take 1,000-1,500 mg by mouth every 4 hours as needed for mild pain or fever   Refills:  0       VITAMIN D (CHOLECALCIFEROL) PO        Dose:  1 tablet   Take 1 tablet by mouth daily   Refills:  0       * Notice:  This list has 2 medication(s) that are the same as other medications prescribed for you. Read the directions carefully, and ask your doctor or other care provider to review them with you.         Where to get your medicines      Some of these will need a paper prescription and others can be bought over the counter. Ask your nurse if you have questions.     Bring a paper prescription for each of these medications     aspirin 81 MG EC tablet    diazepam 5 MG tablet    oxyCODONE 10 MG 12 hr tablet    oxyCODONE 5 MG IR tablet    senna-docusate 8.6-50 MG per tablet                Protect others around you: Learn how to safely use, store and throw away your medicines at www.disposemymeds.org.             Medication List: This is a list of all your medications and when to take them. Check marks below indicate your daily home schedule. Keep this list as a reference.      Medications           Morning Afternoon Evening Bedtime As Needed    aspirin 81 MG EC tablet   Take 2 tablets (162 mg) by mouth daily   Last time this was given:  162 mg on 6/25/2017  8:55 AM   Next Dose Due:  6/26                                   BIOTIN PO   Take 1 tablet by mouth daily   Next Dose Due:  Continue after discharge                                 calcium-vitamin D 600-400 MG-UNIT per tablet   Commonly known as:  CALTRATE   Take 1 tablet by mouth daily   Next Dose Due:  Continue after discharge                                 diazepam 5 MG tablet   Commonly  known as:  VALIUM   Take 1 tablet (5 mg) by mouth every 6 hours as needed for other (muscle spasms)   Last time this was given:  5 mg on 6/25/2017  6:56 AM   Next Dose Due:  6/25                    @ 7pm                  FISH OIL PO   Take 1,000 mg by mouth daily   Next Dose Due:  Continue after discharge                                 * oxyCODONE 5 MG IR tablet   Commonly known as:  ROXICODONE   Take 1-2 tablets (5-10 mg) by mouth every 3 hours as needed for moderate to severe pain   Last time this was given:  10 mg on 6/25/2017 11:54 AM   Next Dose Due:  6/25                @ 3pm                      * oxyCODONE 10 MG 12 hr tablet   Commonly known as:  OxyCONTIN   Take 1 tablet (10 mg) by mouth every 12 hours maximum 2 tablet(s) per day   Last time this was given:  10 mg on 6/25/2017  6:11 AM   Next Dose Due:  6/25                    @ 6pm               senna-docusate 8.6-50 MG per tablet   Commonly known as:  SENOKOT-S;PERICOLACE   Take 1-2 tablets by mouth 2 times daily   Last time this was given:  2 tablets on 6/25/2017  8:55 AM   Next Dose Due:  6/25 6/26 6/25               * SPIRONOLACTONE PO   Take 100 mg by mouth every morning (Takes 2 x 50mg tablet = 100mg dose)   Next Dose Due:  Continue after discharge                                 * SPIRONOLACTONE PO   Take 50 mg by mouth every evening   Next Dose Due:  Continue after discharge                                 tretinoin 0.05 % cream   Commonly known as:  RETIN-A   Apply topically nightly as needed   Next Dose Due:  Continue after discharge                                 TYLENOL PO   Take 1,000-1,500 mg by mouth every 4 hours as needed for mild pain or fever   Last time this was given:  975 mg on 6/25/2017  6:11 AM                                   VITAMIN D (CHOLECALCIFEROL) PO   Take 1 tablet by mouth daily   Next Dose Due:  Continue after discharge                                 * Notice:  This list has 4 medication(s) that  are the same as other medications prescribed for you. Read the directions carefully, and ask your doctor or other care provider to review them with you.

## 2017-06-23 NOTE — PROGRESS NOTES
Admission medication history interview status for the 6/23/2017  admission is complete. See EPIC admission navigator for prior to admission medications     Medication history source reliability:Good    Medication history interview source(s):Patient    Medication history resources (including written lists, pill bottles, clinic record):Patient mailed in her medication list prio to surgery    Primary pharmacy.[email or web address]    Additional medication history information not noted on PTA med list :None    Time spent in this activity: 40 minutes    Prior to Admission medications    Medication Sig Last Dose Taking? Auth Provider   Omega-3 Fatty Acids (FISH OIL PO) Take 1,000 mg by mouth daily 6/9/2017 at AM Yes Reported, Patient   calcium-vitamin D (CALTRATE) 600-400 MG-UNIT per tablet Take 1 tablet by mouth daily 6/12/2017 at AM Yes Reported, Patient   VITAMIN D, CHOLECALCIFEROL, PO Take 1 tablet by mouth daily 6/12/2017 at AM Yes Reported, Patient   BIOTIN PO Take 1 tablet by mouth daily 6/12/2017 at AM Yes Reported, Patient   Acetaminophen (TYLENOL PO) Take 1,000-1,500 mg by mouth every 4 hours as needed for mild pain or fever 6/22/2017 at Early Afternoon Yes Reported, Patient   tretinoin (RETIN-A) 0.05 % cream Apply topically nightly as needed 6/21/2017 at PM Yes Reported, Patient   SPIRONOLACTONE PO Take 100 mg by mouth every morning (Takes 2 x 50mg tablet = 100mg dose) 6/22/2017 at AM Yes Reported, Patient   SPIRONOLACTONE PO Take 50 mg by mouth every evening 6/21/2017 at PM Yes Reported, Patient

## 2017-06-23 NOTE — ANESTHESIA PREPROCEDURE EVALUATION
Anesthesia Evaluation     . Pt has had prior anesthetic.     No history of anesthetic complications          ROS/MED HX    ENT/Pulmonary:      (-) asthma and sleep apnea   Neurologic: Comment: CLBP, right shoulder pain      Cardiovascular:        (-) RIZO   METS/Exercise Tolerance:  >4 METS   Hematologic:         Musculoskeletal:         GI/Hepatic:        (-) GERD   Renal/Genitourinary:         Endo:         Psychiatric:         Infectious Disease:         Malignancy:         Other:                     Physical Exam      Airway   Mallampati: II  TM distance: >3 FB  Neck ROM: full    Dental   (+) caps    Cardiovascular   Rhythm and rate: regular      Pulmonary    breath sounds clear to auscultation                    Anesthesia Plan      History & Physical Review  History and physical reviewed and following examination; no interval change.    ASA Status:  2 .        Plan for Spinal and Periph. Nerve Block for postop pain   PONV prophylaxis:  Ondansetron (or other 5HT-3) and Dexamethasone or Solumedrol  Propofol infusion      Postoperative Care      Consents  Anesthetic plan, risks, benefits and alternatives discussed with:  Patient..                          .  Procedure: Procedure(s):  ARTHROPLASTY KNEE  Preop diagnosis: LEFT KNEE DJD    Allergies   Allergen Reactions     Amoxicillin Hives     Cats Itching     Sulfa Drugs Hives     Latex Rash     History reviewed. No pertinent past medical history.  Past Surgical History:   Procedure Laterality Date     BREAST SURGERY      breast implants      BREAST SURGERY      breasst reduction     COSMETIC SURGERY      breast implants removed     GYN SURGERY      2      GYN SURGERY      tubal ligation     ORTHOPEDIC SURGERY      acl     Prior to Admission medications    Medication Sig Start Date End Date Taking? Authorizing Provider   Omega-3 Fatty Acids (FISH OIL PO) Take 1,000 mg by mouth daily   Yes Reported, Patient   calcium-vitamin D (CALTRATE) 600-400 MG-UNIT  per tablet Take 1 tablet by mouth daily   Yes Reported, Patient   VITAMIN D, CHOLECALCIFEROL, PO Take 1 tablet by mouth daily   Yes Reported, Patient   BIOTIN PO Take 1 tablet by mouth daily   Yes Reported, Patient   Acetaminophen (TYLENOL PO) Take 1,000-1,500 mg by mouth every 4 hours as needed for mild pain or fever   Yes Reported, Patient   tretinoin (RETIN-A) 0.05 % cream Apply topically nightly as needed   Yes Reported, Patient   SPIRONOLACTONE PO Take 100 mg by mouth every morning (Takes 2 x 50mg tablet = 100mg dose)   Yes Reported, Patient   SPIRONOLACTONE PO Take 50 mg by mouth every evening   Yes Reported, Patient     Current Facility-Administered Medications Ordered in Epic   Medication Dose Route Frequency Last Rate Last Dose     clindamycin (CLEOCIN) infusion 900 mg  900 mg Intravenous Pre-Op/Pre-procedure x 1 dose         tranexamic acid (CYKLOKAPRON) 1 g in NaCl 0.9 % 60 mL bolus  1 g Intravenous Once         lidocaine 1 % 1 mL  1 mL Other Q1H PRN         lactated ringers infusion   Intravenous Continuous 25 mL/hr at 06/23/17 0627       No current [facility]-ordered outpatient prescriptions on file.     Wt Readings from Last 1 Encounters:   No data found for Wt     Temp Readings from Last 1 Encounters:   06/23/17 36.3  C (97.4  F) (Temporal)     BP Readings from Last 6 Encounters:   06/23/17 122/90     Pulse Readings from Last 4 Encounters:   No data found for Pulse     Resp Readings from Last 1 Encounters:   06/23/17 16     SpO2 Readings from Last 1 Encounters:   06/23/17 100%     No results for input(s): NA, POTASSIUM, CHLORIDE, CO2, ANIONGAP, GLC, BUN, CR, KASSIE in the last 50249 hours.  No results for input(s): WBC, HGB, PLT in the last 83025 hours.  No results for input(s): INR in the last 28831 hours.    Invalid input(s): APTT   RECENT LABS:   ECG:   ECHO:   CXR:

## 2017-06-23 NOTE — PROGRESS NOTES
1115hr - [facility] rouding on pt - pt awake, surgical and VS remain stable, pt reports improved itchiness.  Okay for pt to transfer to floor per [facility].

## 2017-06-23 NOTE — PROGRESS NOTES
06/23/17 1603   Quick Adds   Type of Visit Initial PT Evaluation   Living Environment   Lives With spouse;child(temi), dependent   Living Arrangements house  ([place])   Number of Stairs to Enter Home 2  (No railing. )   Number of Stairs Within Home 0   Transportation Available car;family or friend will provide  (Pt drove prior to hospitalization. )   Living Environment Comment Pt's spouse and mother will be able to assist at time of discharge. All needs are met on the main level.    Self-Care   Usual Activity Tolerance good   Current Activity Tolerance moderate   Regular Exercise yes   Activity/Exercise Type biking;walking  (Elliptical)   Exercise Amount/Frequency 3-5 times/wk   Equipment Currently Used at Home none   Activity/Exercise/Self-Care Comment Pt owns FWW, crutches, and cane.    Functional Level Prior   Ambulation 0-->independent   Transferring 0-->independent   Fall history within last six months no   Prior Functional Level Comment Pt completed functional mobility independently without use of an AD at baseline.    General Information   Onset of Illness/Injury or Date of Surgery - Date 06/23/17   Referring Physician Tone Campbell PA-C   Patient/Family Goals Statement Return home.    Pertinent History of Current Problem (include personal factors and/or comorbidities that impact the POC) 46 y/o female POD # 0 L TKA.    Precautions/Limitations fall precautions  (KI until SLR. )   Weight-Bearing Status - LLE weight-bearing as tolerated   General Observations Pt laying in bed upon arrival of therapist.    General Info Comments Ambulate with assist.    Cognitive Status Examination   Orientation orientation to person, place and time   Level of Consciousness alert   Follows Commands and Answers Questions 100% of the time   Personal Safety and Judgment intact   Pain Assessment   Patient Currently in Pain (L knee pain at rest: 2/10)   Integumentary/Edema   Integumentary/Edema Comments L knee incision covered  "with dressing.    Posture    Posture Comments No deficits noted.    Range of Motion (ROM)   ROM Comment L knee ROM: 0-74 degrees otherwise B LEs WFL.    Strength   Strength Comments Not formally assessed. Pt demonstrated sufficient strength to complete L SLR.    Bed Mobility   Bed Mobility Comments Supine <> sit, SBA.    Transfer Skills   Transfer Comments Sit <> stand with FWW and CGA.    Gait   Gait Comments Pt amb 8' with FWW and CGA, noted on L knee buckling with no KI donned.    Balance   Balance Comments Noted good sitting and standing balance at FWW.    Sensory Examination   Sensory Perception Comments Pt reports mild numbness in B LEs.    Modality Interventions   Planned Modality Interventions Cryotherapy   Planned Modality Interventions Comments PRN.    General Therapy Interventions   Planned Therapy Interventions bed mobility training;gait training;ROM;strengthening;transfer training   Clinical Impression   Criteria for Skilled Therapeutic Intervention yes, treatment indicated   PT Diagnosis Difficulty with gait.    Influenced by the following impairments Pain, Impaired L knee ROM, Decreased strength, Decreased activity tolerance   Functional limitations due to impairments Limited functional mobility requiring AD and assist.    Clinical Presentation Stable/Uncomplicated   Clinical Presentation Rationale Based on PMH, current presentation, and social support.    Clinical Decision Making (Complexity) Low complexity   Therapy Frequency` 2 times/day   Predicted Duration of Therapy Intervention (days/wks) 4 days   Anticipated Discharge Disposition Home with Outpatient Therapy   Risk & Benefits of therapy have been explained Yes   Patient, Family & other staff in agreement with plan of care Yes   [facility] AM-PAC  \"6 Clicks\" V.2 Basic Mobility Inpatient Short Form   1. Turning from your back to your side while in a flat bed without using bedrails? 4 - None   2. Moving from lying on your back to sitting " on the side of a flat bed without using bedrails? 4 - None   3. Moving to and from a bed to a chair (including a wheelchair)? 3 - A Little   4. Standing up from a chair using your arms (e.g., wheelchair, or bedside chair)? 3 - A Little   5. To walk in hospital room? 3 - A Little   6. Climbing 3-5 steps with a railing? 2 - A Lot   Basic Mobility Raw Score (Score out of 24.Lower scores equate to lower levels of function) 19   Total Evaluation Time   Total Evaluation Time (Minutes) 10

## 2017-06-23 NOTE — ANESTHESIA PROCEDURE NOTES
Peripheral nerve/Neuraxial procedure note : intrathecal  Pre-Procedure  Performed by ANTONI DECKER  Location: OR      Pre-Anesthestic Checklist: patient identified, risks and benefits discussed, informed consent and pre-op evaluation    Timeout  Correct Patient: Yes   Correct Procedure: Yes   Correct Site: Yes     Correct Position: Yes     .   Procedure Documentation    .    Procedure:    Intrathecal.  Insertion Site:L3-4, L4-5  (midline approach)      Patient Prep;mask, sterile gloves, povidone-iodine 7.5% surgical scrub.  .  Needle: Rosalinda-Devin Spinal Needle (gauge): 24  Spinal/LP Needle Length (inches): 3.5 # of attempts: 1 and 2 and  # of redirects:  2 Introducer used .       Assessment/Narrative  Paresthesias: No.  .  .  . Comments:  L 3-4, and L 4-5 bone.    No blood or complications. Therefore GA.

## 2017-06-23 NOTE — ANESTHESIA POSTPROCEDURE EVALUATION
Patient: Chanelle Allen    Procedure(s):  LEFT TOTAL KNEE ARTHROPLASTY (TAI AND NEPHEW)^ LEFT KNEE HARDWARE REMOVAL; BONE GRAFTING TO THE TIBIAL DEFECT - Wound Class: I-Clean   - Wound Class: I-Clean   - Wound Class: I-Clean    Diagnosis:LEFT KNEE DJD  Diagnosis Additional Information: No value filed.    Anesthesia Type:  Spinal, Periph. Nerve Block for postop pain    Note:  Anesthesia Post Evaluation    Patient location during evaluation: PACU  Patient participation: Able to fully participate in evaluation  Level of consciousness: awake  Pain management: adequate  Airway patency: patent  Cardiovascular status: acceptable  Respiratory status: acceptable  Hydration status: acceptable  PONV: controlled     Anesthetic complications: None          Last vitals:  Vitals:    06/23/17 1434 06/23/17 1435 06/23/17 1549   BP:  105/53 107/72   Pulse:   (!) 44   Resp:  16 16   Temp:   36.8  C (98.2  F)   SpO2: (!) 88% 98% 98%         Electronically Signed By: Nimo España MD  June 23, 2017  6:27 PM

## 2017-06-23 NOTE — PROGRESS NOTES
[facility] rounding on pt. Pt reports improving itchiness.  V.O: Repeat dose of Benadry now per earlier orders.

## 2017-06-23 NOTE — IP AVS SNAPSHOT
[address] [facility]    [address] [place] [address]    Phone:  [phone]                                       After Visit Summary   6/23/2017    Chanelle Allen    MRN: [id number]           After Visit Summary Signature Page     I have received my discharge instructions, and my questions have been answered. I have discussed any challenges I see with this plan with the nurse or doctor.    ..........................................................................................................................................  Patient/Patient Representative Signature      ..........................................................................................................................................  Patient Representative Print Name and Relationship to Patient    ..................................................               ................................................  Date                                            Time    ..........................................................................................................................................  Reviewed by Signature/Title    ...................................................              ..............................................  Date                                                            Time

## 2017-06-23 NOTE — OP NOTE
PROCEDURE/SERVICE DATE:  06/23/2017.      SURGEON:  Rick Kemp MD.      FIRST ASSISTANT:  oTne Campbell PA-C.  Bill for  Mr. Campbell as first assist as he did assist with patient transfer, positioning, prepping, draping, intraoperative exposure, wound closure, dressing application and splint application.      SECOND ASSISTANT:  BOOKER Marcelo.      PREOPERATIVE DIAGNOSES:     1.  Left knee post traumatic osteoarthritis.   2.  Retained left knee hardware from previous ACL reconstruction.      POSTOPERATIVE DIAGNOSES:     1.  Left knee post traumatic osteoarthritis.   2.  Retained left knee hardware from previous ACL reconstruction.      PROCEDURES:   1.  Left total knee arthroplasty using [employer] components, a size 4 tibia, a size 4 femur posterior stabilized Oxinium, a 9 mm posterior stabilized polyethylene liner and a 35 mm all polyethylene patellar component, cemented.   2.  Left knee screw removal from the femur and tibia.   3.  Left knee bone grafting of the left tibial cortical defect.      INDICATIONS FOR SURGERY:  Chanelle Allen is a 48-year-old female who tore her ACL a number of years ago.  Unfortunately, the graft failed and she has now had progressively increasing discomfort.  X-ray showed that she was completely bone on bone in all 3 compartments of her knee.  She had failed all reasonable nonoperative options.  After discussion with the patient, it was felt that she would benefit from the above-named procedures.  Informed consent was obtained.      FINDINGS:  Her bone was extensively stained from the previous tetracycline therapy that she had.  The bone quality still, however, was still of very good quality.  The ACL was absent.  However, the PCL was still intact. She had full-thickness chondral defects that involved most of the weightbearing portions of both the medial and lateral femoral condyles as well as the medial and lateral tibial plateaus.  The entire central portion of  the patella also was completely eburnated, as was as the trochlea.      DESCRIPTION OF PROCEDURE:  Ms. Allen was correctly identified me and the AR, and her left lower extremity was marked.  An adductor canal block was then performed.  She was taken to the operating room, where an attempt at a spinal anesthetic was made for approximately 15-20 minutes.  However, because of stenosis, this could not be performed.  She was then placed under general LMA anesthesia.  Antibiotics as well as tranexamic acid and Decadron were administered.  She was then prepped with Avagard, followed by a 10-minute Betadine scrub, alcohol paint, DuraPrep and then ChloraPrep, and then draped in the usual fashion.  A timeout was performed.  The leg was elevated for 1 minute.  The tourniquet was placed up to 300 mmHg.  A paramedian incision was then utilized using the previous incision where her graft had been taken with her prior surgeries.  This was carried down with sharp dissection and skin flaps developed both medially and laterally.  A standard subvastus approach was then utilized.  There was extensive scarring of the fat pad, which was taken down carefully with the Bovie cautery.  We then placed the knee into flexion and subluxated the patella laterally with a Hohmann placed around the proximal femur and Zs placed in the distal femur and then Zs placed in the proximal tibia.  The external tibial guide was then placed, aligning it in the center of the ankle with a 0-degree slope.  This was 2 mm taken off the more deficient medial side.  This was pinned into place, and an oscillating saw was then used to make the cut.  We then sized this to a 4, taking care to externally rotate the component relative to the tibia so it would align well with the tibial tubercle and pinned into place.  We started drilling and then punching.  However, we were not able to bottom it out because we were hitting the tibial screw.  We then thoroughly curetted  around the screw with all the cicatricial tissue around it.  Because it was solidly grown within the bone, I needed to use quarter-inch osteotome to remove it using a box cutting technique around the screw, and we were able to pull the screw from proximally.  However, this did leave a defect in the cortex, which we addressed later.  We then finished drilling and punching the tibia.      We then turned our attention to the femur.  The rest of the PCL was excised.  The intramedullary guide was then placed, placing the distal cutting guide at 5 degrees of valgus.  This was pinned into place, and the distal cut was made in the usual fashion.  We then placed our AP sizing guide.  We initially sized this to a 5 and drilled our lug holes, which were posterior referenced.  However, when we placed our block and started our cut, it was obvious that a 4 gave us a much better fit.  This was placed in 3 degrees of external rotation.  The AP cuts as well as a chamfer cuts were made in the usual fashion.  The floor was then sized.  We started by cutting out for a box.  However, our reamer did run of the femoral screw.  The screw was readily exposed and, again using a box configuration with a curved osteotome, we were able to remove the screw in its entirety without difficulty.  We then continued reaming for the box and then cut the box with the  as well.  We then trialed our components for the tibia and the femur.  It was felt that a size 9 gave us a very nice fit.  She had full extension at this time, which made up for the 5 degrees of flexion contracture that she had.  In addition, the instability of varus and valgus stress that she initially had was now stabilized, and she had a nice Lachman as well.  We removed the tibial components and turned our attention next to the patella.      Two towel clips were placed, and all the synovium was aligned.  A free-hand cut was made with 2 cuts starting at the junction of the  chondral border.  We sized this to a 35 oval.  Lug hole drills were made, and multiple small drill holes were then made for better cement interdigitation.  We had no exposed bone around the patella at all, and it fit nearly perfectly.  The knee was then brought into flexion.  All trial components were removed.  The periarticular injection solution was then injected in the posterior capsule in 2 mm increments around the entire capsule.  All bony surfaces were then thoroughly drilled with a small drill bit to allow for better cement interdigitation.  We then thoroughly irrigated the wound with normal saline.  Two batches of plain Symplex cement were mixed.  The tibia, followed by the femur and the patella, was cemented.  All excess bone cement was removed.  The trial 9 liner was placed.  The knee was then placed into full extension.  The knee was then lavaged with dilute warm Betadine solution until the cement had hardened.  We then brought the knee back into flexion and thoroughly irrigated with normal saline.  It was felt that the 9 polyethylene liner gave the best fit, and the permanent liner was snapped into place.  We then placed the patella back.  We had excellent patellofemoral tracking with flexion and extension.  There was no impingement.  The tourniquet at this point was then let down.  Sixty mg of Toradol was administered, as well as a second dose of tranexamic acid.  The capsule was then repaired with a running #2 Quill, followed by closure of the fatty layer with an interrupted 0 Vicryl and subdermal closed with a 2-0 Quill.  Steri-Strips were applied as well as sterile dressings and a knee immobilizer.      COMPLICATIONS:  None.      SPECIMENS:  These screws were not saved.        ESTIMATED BLOOD LOSS:  20 cc.      Sponge and needle count was correct.  The patient was then extubated and taken to the PAR in stable condition.         BURKE KRISHNAMURTHY MD             D: 06/23/2017 09:25   T: 06/23/2017 12:02    MT: [id number]      Name:     CARLOS ENRIQUE LOERA   MRN:      [id number]        Account:        [id number]   :      1970           Procedure Date: 2017      Document: [id number]

## 2017-06-23 NOTE — ANESTHESIA PROCEDURE NOTES
Peripheral nerve/Neuraxial procedure note : Femoral and Adductor canal  Pre-Procedure  Performed by ANTONI DECKER  Location: pre-op      Pre-Anesthestic Checklist: patient identified, site marked, risks and benefits discussed, informed consent, monitors and equipment checked, pre-op evaluation, at physician/surgeon's request and post-op pain management    Timeout  Correct Patient: Yes   Correct Procedure: Yes   Correct Site: Yes   Correct Laterality: Yes     Site Marked: Yes   .   Procedure Documentation    .    Procedure:    Femoral and Adductor canal.  Local skin infiltrated with 5 mL of 1% lidocaine.     Ultrasound used to identify targeted nerve, plexus, or vascular marker and placed a needle adjacent to it., Ultrasound was used to visualize the spread of the anesthetic in close proximity to the above stated nerve. A permanent image is entered into the patient's record.  Patient Prep;mask, sterile gloves, chlorhexidine gluconate and isopropyl alcohol.  Nerve Stim: Initial Level mA. Lowest motor response 0.7 mA..  Needle: short bevel Needle Gauge: 21.  Needle Length (millimeters) 80  Insertion Method: Single Shot.       Assessment/Narrative  Paresthesias: No.  .  The placement was negative for: blood aspirated, painful injection and site bleeding.  Bolus given via needle..   Secured via.   Complications: none. Comments:  Real time US used to identify nerve, needle and observe injection of local anesthetic around nerve.  Low pressure.  No complications.  US image documented.

## 2017-06-23 NOTE — PLAN OF CARE
Problem: Goal Outcome Summary  Goal: Goal Outcome Summary  Outcome: Improving  Pt is A&O x4, POD-0, No lombardi or drains, continuous IV running. PT appointment at 4pm, Using bed pan for voiding. Denies any pain, administered scheduled tylenol. O2 place at 2L d/t low O2 sat.

## 2017-06-23 NOTE — PROGRESS NOTES
1027hr - PA España rounding on pt. Pt awake, reports feeling nasal itching. NC dc'd by PA  V.O Give Benadryl 12.5mg IVP now - ok to repeat dose x1 if  Needed per MD España.

## 2017-06-23 NOTE — PLAN OF CARE
Problem: Goal Outcome Summary  Goal: Goal Outcome Summary  PT: Orders received, evaluation completed, and treatment initiated. Patient is a 46 y/o female POD # 0 L TKA. Patient lives with her spouse and 2 children with 2 steps to enter/exit and all needs are met on the main level. Patient completed functional mobility independently without use of an assistive device at baseline. Patient owns a walker, crutches, and a cane.      Discharge Planner PT   Patient plan for discharge: Home with spouse.     Current status: Pt performed bed mobility and sit <> stand with FWW, SBA. Patient ambulated 8 feet and 15 feet x 2 with FWW and CGA, noted no L knee buckling with no KI donned. Patient tolerated supine strengthening/ROM exercises. L knee ROM: 0-74 degrees.      Barriers to return to prior living situation: Stairs, not yet assessed.      Recommendations for discharge:  TBD POD # 2     Rationale for recommendations: Will continue to assess and determine on POD # 2        Entered by: Lana Elam 06/23/2017 5:37 PM

## 2017-06-23 NOTE — ANESTHESIA CARE TRANSFER NOTE
Patient: Chanelle Allen    Procedure(s):  LEFT TOTAL KNEE ARTHROPLASTY (TAI AND NEPHEW)^ LEFT KNEE HARDWARE REMOVAL; BONE GRAFTING TO THE TIBIAL DEFECT - Wound Class: I-Clean   - Wound Class: I-Clean   - Wound Class: I-Clean    Diagnosis: LEFT KNEE DJD  Diagnosis Additional Information: No value filed.    Anesthesia Type:   Spinal, Periph. Nerve Block for postop pain     Note:  Airway :Face Mask  Patient transferred to:PACU  Comments: VSS. Airway and IV patent. Patient comfortable. Report to RN. Stable care transfer.      Vitals: (Last set prior to Anesthesia Care Transfer)    CRNA VITALS  6/23/2017 0906 - 6/23/2017 0942      6/23/2017             Pulse: 101    SpO2: 100 %    Resp Rate (set): 10                Electronically Signed By: MORGAN Dillon CRNA  June 23, 2017  9:42 AM

## 2017-06-24 ENCOUNTER — APPOINTMENT (OUTPATIENT)
Dept: PHYSICAL THERAPY | Facility: CLINIC | Age: 47
DRG: 470 | End: 2017-06-24
Attending: ORTHOPAEDIC SURGERY
Payer: COMMERCIAL

## 2017-06-24 LAB
GLUCOSE SERPL-MCNC: 99 MG/DL (ref 70–99)
HGB BLD-MCNC: 10.9 G/DL (ref 11.7–15.7)

## 2017-06-24 PROCEDURE — 25000125 ZZHC RX 250: Performed by: PHYSICIAN ASSISTANT

## 2017-06-24 PROCEDURE — S0077 INJECTION, CLINDAMYCIN PHOSP: HCPCS | Performed by: PHYSICIAN ASSISTANT

## 2017-06-24 PROCEDURE — 97110 THERAPEUTIC EXERCISES: CPT | Mod: GP | Performed by: PHYSICAL THERAPIST

## 2017-06-24 PROCEDURE — 97116 GAIT TRAINING THERAPY: CPT | Mod: GP | Performed by: PHYSICAL THERAPY ASSISTANT

## 2017-06-24 PROCEDURE — 97110 THERAPEUTIC EXERCISES: CPT | Mod: GP | Performed by: PHYSICAL THERAPY ASSISTANT

## 2017-06-24 PROCEDURE — 25000132 ZZH RX MED GY IP 250 OP 250 PS 637: Performed by: PHYSICIAN ASSISTANT

## 2017-06-24 PROCEDURE — 36415 COLL VENOUS BLD VENIPUNCTURE: CPT | Performed by: PHYSICIAN ASSISTANT

## 2017-06-24 PROCEDURE — 12000007 ZZH R&B INTERMEDIATE

## 2017-06-24 PROCEDURE — 82947 ASSAY GLUCOSE BLOOD QUANT: CPT | Performed by: ORTHOPAEDIC SURGERY

## 2017-06-24 PROCEDURE — 40000193 ZZH STATISTIC PT WARD VISIT: Performed by: PHYSICAL THERAPY ASSISTANT

## 2017-06-24 PROCEDURE — 97116 GAIT TRAINING THERAPY: CPT | Mod: GP | Performed by: PHYSICAL THERAPIST

## 2017-06-24 PROCEDURE — 85018 HEMOGLOBIN: CPT | Performed by: PHYSICIAN ASSISTANT

## 2017-06-24 PROCEDURE — 40000193 ZZH STATISTIC PT WARD VISIT: Performed by: PHYSICAL THERAPIST

## 2017-06-24 RX ADMIN — OXYCODONE HYDROCHLORIDE 10 MG: 5 TABLET ORAL at 14:42

## 2017-06-24 RX ADMIN — SENNOSIDES AND DOCUSATE SODIUM 2 TABLET: 8.6; 5 TABLET ORAL at 08:41

## 2017-06-24 RX ADMIN — ASPIRIN 162 MG: 81 TABLET, COATED ORAL at 08:40

## 2017-06-24 RX ADMIN — SENNOSIDES AND DOCUSATE SODIUM 2 TABLET: 8.6; 5 TABLET ORAL at 20:42

## 2017-06-24 RX ADMIN — DIAZEPAM 5 MG: 5 TABLET ORAL at 18:50

## 2017-06-24 RX ADMIN — OXYCODONE HYDROCHLORIDE 10 MG: 10 TABLET, FILM COATED, EXTENDED RELEASE ORAL at 18:50

## 2017-06-24 RX ADMIN — OXYCODONE HYDROCHLORIDE 10 MG: 5 TABLET ORAL at 05:46

## 2017-06-24 RX ADMIN — OXYCODONE HYDROCHLORIDE 10 MG: 5 TABLET ORAL at 23:46

## 2017-06-24 RX ADMIN — OXYCODONE HYDROCHLORIDE 10 MG: 5 TABLET ORAL at 02:48

## 2017-06-24 RX ADMIN — OXYCODONE HYDROCHLORIDE 10 MG: 5 TABLET ORAL at 11:53

## 2017-06-24 RX ADMIN — ACETAMINOPHEN 975 MG: 325 TABLET, FILM COATED ORAL at 22:03

## 2017-06-24 RX ADMIN — CLINDAMYCIN PHOSPHATE 900 MG: 18 INJECTION, SOLUTION INTRAVENOUS at 01:51

## 2017-06-24 RX ADMIN — OXYCODONE HYDROCHLORIDE 10 MG: 5 TABLET ORAL at 20:42

## 2017-06-24 RX ADMIN — OXYCODONE HYDROCHLORIDE 10 MG: 5 TABLET ORAL at 17:41

## 2017-06-24 RX ADMIN — ACETAMINOPHEN 975 MG: 325 TABLET, FILM COATED ORAL at 14:42

## 2017-06-24 RX ADMIN — OXYCODONE HYDROCHLORIDE 10 MG: 5 TABLET ORAL at 08:40

## 2017-06-24 RX ADMIN — OXYCODONE HYDROCHLORIDE 10 MG: 10 TABLET, FILM COATED, EXTENDED RELEASE ORAL at 05:46

## 2017-06-24 RX ADMIN — DIAZEPAM 5 MG: 5 TABLET ORAL at 12:54

## 2017-06-24 RX ADMIN — ACETAMINOPHEN 975 MG: 325 TABLET, FILM COATED ORAL at 05:46

## 2017-06-24 NOTE — BRIEF OP NOTE
[facility]    Brief Operative Note    Pre-operative diagnosis: POST-TRAUMATIC LEFT KNEE DJD  Post-operative diagnosis SAME  Procedure: Procedure(s):  LEFT TOTAL KNEE ARTHROPLASTY (TAI AND NEPHEW)^ LEFT KNEE HARDWARE REMOVAL; BONE GRAFTING TO THE TIBIAL DEFECT - Wound Class: I-Clean   - Wound Class: I-Clean   - Wound Class: I-Clean  Surgeon: Surgeon(s) and Role:     * Rick Kemp MD - Primary  First Assistant: Tone Campbell PA-C  Second Assistant: SUAD Erazo  Anesthesia: Combined General with Femoral Nerve Block   Estimated blood loss: 20mL  Drains: None  Specimens: None  Findings:  Left knee DJD  Complications: None.  Implants: Left total knee arthroplasty prosthesis  Grafts:  None.

## 2017-06-24 NOTE — PLAN OF CARE
"Problem: Goal Outcome Summary  Goal: Goal Outcome Summary  Discharge Planner PT   Patient plan for discharge: Disch to home with help of spouse and OPPT.  Surgeon: \"Plan disch tomorrow.\"  Current status: PT: Needs CGA and vc for exercise, bed mobility, transfers, and gait with FWW, WBAT L, 70' with c/o dizzy at midpoint.  L knee ROM :  8-80 degrees.  Barriers to return to prior living situation: Postop pain, edema, weakness, dizziness and stairs.  Recommendations for discharge: TBD POD #2.  Rationale for recommendations: TBD POD#2.       Entered by: Steven Álvarez 06/24/2017 1:24 PM         "

## 2017-06-24 NOTE — PLAN OF CARE
Problem: Goal Outcome Summary  Goal: Goal Outcome Summary  A&O. VSS on 2L O2 overnight. Up with SBA and walker. Pain managed with oxycodone and Tylenol. CMS intact. Dressing CDI. Voiding adequately. Slept between cares.

## 2017-06-24 NOTE — PLAN OF CARE
Problem: Goal Outcome Summary  Goal: Goal Outcome Summary  Outcome: Improving  VSS, A&Ox4. CMS intact. Up A1 to BR and voiding adequately. Oxycodone and valium given for pain control. Progressing per plan, will continue to monitor.

## 2017-06-24 NOTE — PROGRESS NOTES
[facility] Orthopedic Post-Op Note         Assessment and Plan:    Assessment:   Post-operative day #1  Total knee arthoplasty (Left)     Doing well. Slept ok despite frequent nursing visits.  No immediate surgical complications identified.  Pain well-controlled.  Tolerating physical therapy and rehabilitation well.  Patient tolerated food and medications without nausea       Plan:   Ambulate  Advance activity as tolerated  Continue physical therapy  Continue pain control measures  Advance diet as tolerated  Remove ACE bandage and change dressing today to waterproof AG dressing  Plan for discharge tomorrow           Interval History:   Stable.  Doing well.  Improving slowly.  Pain is reasonably controlled.  No fevers.            Significant Problems:   None          Review of Systems:   The patient denies any chest pain, shortness of breath, excessive pain, fever, chills, purulent drainage from the wound, nausea or vomiting.          Medications:   All medications related to the patient's surgery have been reviewed          Physical Exam:   All vitals stable  Temp: 97.8  F (36.6  C) Temp src: Oral BP: 98/52 Pulse: 81 Heart Rate: 62 Resp: 16 SpO2: 100 % O2 Device: Nasal cannula Oxygen Delivery: 2 LPM  Dressing dry and intact. Leg wrapped in ACE bandage. No calf tenderness. Sensation and circulation in distal left lower extremity intact.           Data:   All laboratory data related to this surgery reviewed  No results found for: HGB  All imaging studies related to this surgery reviewed     Tone Campbell PA-C

## 2017-06-24 NOTE — PLAN OF CARE
Problem: Goal Outcome Summary  Goal: Goal Outcome Summary  Outcome: Improving  Patient up with SBA and walker, requires minimal assistance. Adequate I/O. Pain managed with oral pain meds.

## 2017-06-25 ENCOUNTER — APPOINTMENT (OUTPATIENT)
Dept: PHYSICAL THERAPY | Facility: CLINIC | Age: 47
DRG: 470 | End: 2017-06-25
Attending: ORTHOPAEDIC SURGERY
Payer: COMMERCIAL

## 2017-06-25 VITALS
RESPIRATION RATE: 16 BRPM | HEART RATE: 93 BPM | DIASTOLIC BLOOD PRESSURE: 73 MMHG | SYSTOLIC BLOOD PRESSURE: 121 MMHG | TEMPERATURE: 98.4 F | OXYGEN SATURATION: 99 %

## 2017-06-25 LAB
GLUCOSE SERPL-MCNC: 102 MG/DL (ref 70–99)
HGB BLD-MCNC: 11.1 G/DL (ref 11.7–15.7)

## 2017-06-25 PROCEDURE — 97110 THERAPEUTIC EXERCISES: CPT | Mod: GP

## 2017-06-25 PROCEDURE — 82947 ASSAY GLUCOSE BLOOD QUANT: CPT | Performed by: ORTHOPAEDIC SURGERY

## 2017-06-25 PROCEDURE — 25000132 ZZH RX MED GY IP 250 OP 250 PS 637: Performed by: PHYSICIAN ASSISTANT

## 2017-06-25 PROCEDURE — 97116 GAIT TRAINING THERAPY: CPT | Mod: GP

## 2017-06-25 PROCEDURE — 85018 HEMOGLOBIN: CPT | Performed by: PHYSICIAN ASSISTANT

## 2017-06-25 PROCEDURE — 40000193 ZZH STATISTIC PT WARD VISIT: Performed by: PHYSICAL THERAPY ASSISTANT

## 2017-06-25 PROCEDURE — 40000193 ZZH STATISTIC PT WARD VISIT

## 2017-06-25 PROCEDURE — 36415 COLL VENOUS BLD VENIPUNCTURE: CPT | Performed by: ORTHOPAEDIC SURGERY

## 2017-06-25 PROCEDURE — 97110 THERAPEUTIC EXERCISES: CPT | Mod: GP | Performed by: PHYSICAL THERAPY ASSISTANT

## 2017-06-25 PROCEDURE — 40000894 ZZH STATISTIC OT IP EVAL DEFER

## 2017-06-25 PROCEDURE — 97116 GAIT TRAINING THERAPY: CPT | Mod: GP | Performed by: PHYSICAL THERAPY ASSISTANT

## 2017-06-25 RX ORDER — DIAZEPAM 5 MG
5 TABLET ORAL EVERY 6 HOURS PRN
Qty: 60 TABLET | Refills: 0 | Status: SHIPPED | OUTPATIENT
Start: 2017-06-25 | End: 2022-02-14

## 2017-06-25 RX ORDER — OXYCODONE HCL 10 MG/1
10 TABLET, FILM COATED, EXTENDED RELEASE ORAL EVERY 12 HOURS
Qty: 60 TABLET | Refills: 0 | Status: SHIPPED | OUTPATIENT
Start: 2017-06-25 | End: 2022-02-14

## 2017-06-25 RX ORDER — AMOXICILLIN 250 MG
1-2 CAPSULE ORAL 2 TIMES DAILY
Qty: 100 TABLET | Refills: 0 | Status: SHIPPED | OUTPATIENT
Start: 2017-06-25 | End: 2022-02-14

## 2017-06-25 RX ORDER — OXYCODONE HYDROCHLORIDE 5 MG/1
5-10 TABLET ORAL
Qty: 90 TABLET | Refills: 0 | Status: SHIPPED | OUTPATIENT
Start: 2017-06-25 | End: 2022-02-14

## 2017-06-25 RX ADMIN — ACETAMINOPHEN 975 MG: 325 TABLET, FILM COATED ORAL at 13:01

## 2017-06-25 RX ADMIN — DIAZEPAM 5 MG: 5 TABLET ORAL at 06:56

## 2017-06-25 RX ADMIN — DIPHENHYDRAMINE HYDROCHLORIDE 25 MG: 25 CAPSULE ORAL at 10:26

## 2017-06-25 RX ADMIN — OXYCODONE HYDROCHLORIDE 10 MG: 5 TABLET ORAL at 08:55

## 2017-06-25 RX ADMIN — SENNOSIDES AND DOCUSATE SODIUM 2 TABLET: 8.6; 5 TABLET ORAL at 08:55

## 2017-06-25 RX ADMIN — OXYCODONE HYDROCHLORIDE 10 MG: 5 TABLET ORAL at 03:07

## 2017-06-25 RX ADMIN — ASPIRIN 162 MG: 81 TABLET, COATED ORAL at 08:55

## 2017-06-25 RX ADMIN — DIAZEPAM 5 MG: 5 TABLET ORAL at 13:01

## 2017-06-25 RX ADMIN — OXYCODONE HYDROCHLORIDE 10 MG: 5 TABLET ORAL at 11:54

## 2017-06-25 RX ADMIN — ACETAMINOPHEN 975 MG: 325 TABLET, FILM COATED ORAL at 06:11

## 2017-06-25 RX ADMIN — DIAZEPAM 5 MG: 5 TABLET ORAL at 00:58

## 2017-06-25 RX ADMIN — OXYCODONE HYDROCHLORIDE 10 MG: 5 TABLET ORAL at 06:11

## 2017-06-25 RX ADMIN — OXYCODONE HYDROCHLORIDE 10 MG: 10 TABLET, FILM COATED, EXTENDED RELEASE ORAL at 06:11

## 2017-06-25 NOTE — PLAN OF CARE
Problem: Goal Outcome Summary  Goal: Goal Outcome Summary  Outcome: Improving  Progressing per plan of care.

## 2017-06-25 NOTE — PLAN OF CARE
Problem: Goal Outcome Summary  Goal: Goal Outcome Summary  Outcome: Improving  Pt up with SBA of 1 and walker, pt's pain is decreasing as the block has been wearing off.  Pt taking valium, oxycodone, OxyContin for good pain relief.  Pt is progressing towards goals.

## 2017-06-25 NOTE — PLAN OF CARE
Problem: Goal Outcome Summary  Goal: Goal Outcome Summary  Outcome: Adequate for Discharge Date Met:  06/25/17  VSS, A&Ox4. CMS intact. Pain controlled with oxycodone and valium. Discharge instructions reviewed with pt and spouse, all questions answered. Discharged to home.

## 2017-06-25 NOTE — PLAN OF CARE
Problem: Goal Outcome Summary  Goal: Goal Outcome Summary  OT: Orders rec'd. Patient up MOD I'ly in room, reports no difficulty with ADL. Just used the toilet and was standing at the sink brushing her teeth. No need for OT eval.

## 2017-06-25 NOTE — PLAN OF CARE
Problem: Goal Outcome Summary  Goal: Goal Outcome Summary  Discharge Planner PT   Patient plan for discharge: Home with assist and OP PT  Current status: Pt performed bed mobility and sit to/from stand transfers with supervision only.  Pt performed gait training x 220 ft using wheeled walker with SBA and cues for gait quality.  Pt performed 1 platform step x 3 trials using wheeled walker and SBA.  Knee AAROM is 7-84 degrees.  Barriers to return to prior living situation: None  Recommendations for discharge: Home with OP PT per plan established by the PT.   Rationale for recommendations: Pt is progressing well and is safe to discharge home with assist as needed and OP PT.       Entered by: Nany Dobbs 06/25/2017 11:10 AM

## 2017-06-25 NOTE — PROGRESS NOTES
[facility] Orthopedic Post-Op Note         Assessment and Plan:    Assessment:   Post-operative day #2  Total knee arthoplasty (Left)     Doing well.  Normal healing wound.  No immediate surgical complications identified.  Pain well-controlled.  Tolerating physical therapy and rehabilitation well.  One short episode of nausea that resolved with sitting down.       Plan:   Ambulate  Advance activity as tolerated  Continue pain control measures  Plan to discharge to home today.  Aspirin for DVT prophylaxis           Interval History:   Doing well.  Continues to improve.  Pain is well-controlled.  No fevers. PT went well. Patient tolerating food and medications without issue.            Significant Problems:   None          Review of Systems:   The patient denies any chest pain, shortness of breath, excessive pain, fever, chills, purulent drainage from the wound, nausea or vomiting.          Medications:   All medications related to the patient's surgery have been reviewed          Physical Exam:   All vitals stable  Temp: 98.2  F (36.8  C) Temp src: Oral BP: 107/62   Heart Rate: 69 Resp: 16 SpO2: 98 % O2 Device: None (Room air)    Wound clean and dry with minimal or no drainage.  Surrounding skin with minimal erythema.  Dressing dry and intact.           Data:     All laboratory data related to this surgery reviewed  Hemoglobin   Date Value Ref Range Status   06/24/2017 10.9 (L) 11.7 - 15.7 g/dL Final     All imaging studies related to this surgery reviewed     Tone Campbell PA-C

## 2017-06-25 NOTE — PLAN OF CARE
Problem: Goal Outcome Summary  Goal: Goal Outcome Summary  Outcome: Therapy, progress toward functional goals as expected  Patient plan for discharge: Home with assist and OP PT  Current status: Pt performed bed mobility and sit to/from stand transfers with SBA only.  Pt performed gait training 200ft x 2 using FWW with SBA and cues for gait quality.  Pt performed 1 platform step x 2 trials using wheeled walker and SBA. Tolerates TKA exs moderately due to increased pain. L Knee AAROM is 7-84 degrees.  Barriers to return to prior living situation: None;  was present for PT session for education and training  Recommendations for discharge: Home with OP PT per plan established by the PT.   Rationale for recommendations: Pt is progressing well and is safe to discharge home with assist as needed and OPPT to progress strengthening, ROM and gait mechanics.     Pt discharging home today.     PT goals not met.

## 2017-06-25 NOTE — PLAN OF CARE
Problem: Goal Outcome Summary  Goal: Goal Outcome Summary  A&O. VSS on RA. Up with SBA and walker. Pain managed with oxycodone, oxycontin, valium and Tylenol. CMS intact. Dressing CDI. Voiding adequately. Slept between cares. Plan to DC home today

## 2017-09-20 NOTE — DISCHARGE SUMMARY
[facility] Discharge Summary    Chanelle Allen MRN# [id number]   Age: 47 year old YOB: 1970     Date of Admission:  6/23/2017  Date of Discharge::  6/25/2017  2:47 PM  Admitting Physician:  Rick Kemp MD  Discharge Physician:  Rick Kemp MD     Home clinic: [facility]          Admission Diagnoses:   LEFT KNEE DJD          Discharge Diagnosis:   LEFT KNEE DJD          Procedures:   Procedure(s): Left total knee arthroplasty              Medications Prior to Admission:     No prescriptions prior to admission.             Discharge Medications:     Discharge Medication List as of 6/25/2017 12:49 PM      START taking these medications    Details   oxyCODONE (ROXICODONE) 5 MG IR tablet Take 1-2 tablets (5-10 mg) by mouth every 3 hours as needed for moderate to severe pain, Disp-90 tablet, R-0, Local Print      aspirin EC 81 MG EC tablet Take 2 tablets (162 mg) by mouth daily, Disp-120 tablet, R-0, Local Print      diazepam (VALIUM) 5 MG tablet Take 1 tablet (5 mg) by mouth every 6 hours as needed for other (muscle spasms), Disp-60 tablet, R-0, Local Print      senna-docusate (SENOKOT-S;PERICOLACE) 8.6-50 MG per tablet Take 1-2 tablets by mouth 2 times daily, Disp-100 tablet, R-0, Local Print      oxyCODONE (OXYCONTIN) 10 MG 12 hr tablet Take 1 tablet (10 mg) by mouth every 12 hours maximum 2 tablet(s) per day, Disp-60 tablet, R-0, Local Print         CONTINUE these medications which have NOT CHANGED    Details   Omega-3 Fatty Acids (FISH OIL PO) Take 1,000 mg by mouth daily, Historical      calcium-vitamin D (CALTRATE) 600-400 MG-UNIT per tablet Take 1 tablet by mouth daily, Historical      VITAMIN D, CHOLECALCIFEROL, PO Take 1 tablet by mouth daily, Historical      BIOTIN PO Take 1 tablet by mouth daily, Historical      Acetaminophen (TYLENOL PO) Take 1,000-1,500 mg by mouth every 4 hours as needed for mild pain or fever, Historical      tretinoin (RETIN-A) 0.05 % cream Apply  topically nightly as neededHistorical      !! SPIRONOLACTONE PO Take 100 mg by mouth every morning (Takes 2 x 50mg tablet = 100mg dose), Historical      !! SPIRONOLACTONE PO Take 50 mg by mouth every evening, Historical       !! - Potential duplicate medications found. Please discuss with provider.                Consultations:   Consultation during this admission received from internal medicine.  No medical intervention was indicated.            Brief History of Illness:   Reason for admission requiring a surgical or invasive procedure:   LEFT KNEE DJD   The patient underwent the following procedure(s):   Left total knee arthroplasty   There were no immediate complications during this procedure.    Please refer to the full operative summary for details.           Hospital Course:   The patient's hospital course was unremarkable.  She recovered as anticipated and experienced no post-operative complications.          Discharge Instructions and Follow-Up:   Discharge diet: Regular   Discharge activity: Activity as tolerated   Discharge follow-up: Follow up with Dr. Kemp's office in 10-14 days   Wound care: Keep bandage on until seen in clinic. May shower over bandage and pat dry.           Discharge Disposition:   Discharged to home      Attestation:  I have reviewed today's vital signs, notes, medications, labs and imaging.  Amount of time performed on this discharge summary: 15 minutes.    Tone Campbell PA-C

## 2017-12-28 ENCOUNTER — HOSPITAL ENCOUNTER (OUTPATIENT)
Dept: MAMMOGRAPHY | Facility: CLINIC | Age: 47
Discharge: HOME | End: 2017-12-28
Attending: OBSTETRICS & GYNECOLOGY | Admitting: OBSTETRICS & GYNECOLOGY
Payer: COMMERCIAL

## 2017-12-28 DIAGNOSIS — Z12.31 ENCOUNTER FOR SCREENING MAMMOGRAM FOR HIGH-RISK PATIENT: ICD-10-CM

## 2017-12-28 PROCEDURE — G0202 SCR MAMMO BI INCL CAD: HCPCS

## 2017-12-28 PROCEDURE — 77063 BREAST TOMOSYNTHESIS BI: CPT

## 2019-08-21 ENCOUNTER — HOSPITAL ENCOUNTER (OUTPATIENT)
Dept: MAMMOGRAPHY | Facility: CLINIC | Age: 49
Discharge: HOME | End: 2019-08-21
Attending: OBSTETRICS & GYNECOLOGY | Admitting: OBSTETRICS & GYNECOLOGY
Payer: COMMERCIAL

## 2019-08-21 DIAGNOSIS — Z12.31 VISIT FOR SCREENING MAMMOGRAM: ICD-10-CM

## 2019-08-21 PROCEDURE — 77063 BREAST TOMOSYNTHESIS BI: CPT

## 2019-10-02 ENCOUNTER — HEALTH MAINTENANCE LETTER (OUTPATIENT)
Age: 49
End: 2019-10-02

## 2021-01-12 ENCOUNTER — HOSPITAL ENCOUNTER (OUTPATIENT)
Dept: MAMMOGRAPHY | Facility: CLINIC | Age: 51
Discharge: HOME | End: 2021-01-12
Attending: OBSTETRICS & GYNECOLOGY | Admitting: OBSTETRICS & GYNECOLOGY
Payer: COMMERCIAL

## 2021-01-12 DIAGNOSIS — Z12.31 OTHER SCREENING MAMMOGRAM: ICD-10-CM

## 2021-01-12 PROCEDURE — 77067 SCR MAMMO BI INCL CAD: CPT

## 2021-01-15 ENCOUNTER — HEALTH MAINTENANCE LETTER (OUTPATIENT)
Age: 51
End: 2021-01-15

## 2021-09-04 ENCOUNTER — HEALTH MAINTENANCE LETTER (OUTPATIENT)
Age: 51
End: 2021-09-04

## 2022-01-17 ENCOUNTER — HOSPITAL ENCOUNTER (OUTPATIENT)
Dept: MAMMOGRAPHY | Facility: CLINIC | Age: 52
Discharge: HOME | End: 2022-01-17
Attending: OBSTETRICS & GYNECOLOGY | Admitting: OBSTETRICS & GYNECOLOGY
Payer: COMMERCIAL

## 2022-01-17 DIAGNOSIS — Z12.31 VISIT FOR SCREENING MAMMOGRAM: ICD-10-CM

## 2022-01-17 PROCEDURE — 77067 SCR MAMMO BI INCL CAD: CPT

## 2022-02-02 DIAGNOSIS — Z11.59 ENCOUNTER FOR SCREENING FOR OTHER VIRAL DISEASES: Primary | ICD-10-CM

## 2022-02-04 ENCOUNTER — TRANSFERRED RECORDS (OUTPATIENT)
Dept: MULTI SPECIALTY CLINIC | Facility: CLINIC | Age: 52
End: 2022-02-04
Payer: COMMERCIAL

## 2022-02-04 LAB — POTASSIUM (EXTERNAL): 4.4 MMOL/L (ref 3.5–5)

## 2022-02-11 ENCOUNTER — LAB (OUTPATIENT)
Dept: URGENT CARE | Facility: URGENT CARE | Age: 52
End: 2022-02-11
Attending: ORTHOPAEDIC SURGERY
Payer: COMMERCIAL

## 2022-02-11 DIAGNOSIS — Z11.59 ENCOUNTER FOR SCREENING FOR OTHER VIRAL DISEASES: ICD-10-CM

## 2022-02-11 PROCEDURE — U0005 INFEC AGEN DETEC AMPLI PROBE: HCPCS

## 2022-02-11 PROCEDURE — U0003 INFECTIOUS AGENT DETECTION BY NUCLEIC ACID (DNA OR RNA); SEVERE ACUTE RESPIRATORY SYNDROME CORONAVIRUS 2 (SARS-COV-2) (CORONAVIRUS DISEASE [COVID-19]), AMPLIFIED PROBE TECHNIQUE, MAKING USE OF HIGH THROUGHPUT TECHNOLOGIES AS DESCRIBED BY CMS-2020-01-R: HCPCS

## 2022-02-12 LAB — SARS-COV-2 RNA RESP QL NAA+PROBE: NEGATIVE

## 2022-02-14 RX ORDER — TRAZODONE HYDROCHLORIDE 50 MG/1
50 TABLET, FILM COATED ORAL
COMMUNITY

## 2022-02-14 RX ORDER — PROGESTERONE 200 MG/1
200 CAPSULE ORAL DAILY
Status: ON HOLD | COMMUNITY
End: 2022-02-15

## 2022-02-14 RX ORDER — CYCLOBENZAPRINE HCL 10 MG
10 TABLET ORAL 3 TIMES DAILY PRN
COMMUNITY

## 2022-02-14 RX ORDER — ESTRADIOL 0.1 MG/D
1 FILM, EXTENDED RELEASE TRANSDERMAL
Status: ON HOLD | COMMUNITY
End: 2022-02-15

## 2022-02-14 RX ORDER — SPIRONOLACTONE 50 MG/1
50 TABLET, FILM COATED ORAL EVERY EVENING
COMMUNITY

## 2022-02-14 NOTE — PROGRESS NOTES
Total Joint Patient Screening    1. Do you have a ride available to come to the hospital the day after your surgery by 8am with anticipated discharge of 11am? Yes  2. What is the name of this person? Faizan (spouse)  3. Do you have a  set up after surgery? Yes  4. Will your  be the same person that gives you a ride home after surgery? Yes  5. Have you received the Joint Replacement Guidebook? Yes  6. Do you have any questions about your guidebook?  No  7. Have you activated you [facility] [employer] [facility] account? Yes  8. Have you signed up for MY Chart access? Yes

## 2022-02-14 NOTE — PROGRESS NOTES
PTA medications updated by Medication Scribe prior to surgery via phone call with patient (last doses completed by Nurse)     Medication history sources: Patient, Surescripts and H&P  In the past week, patient estimated taking medication this percent of the time: Greater than 90%  Adherence assessment: N/A Not Observed    Significant changes made to the medication list:  Patient reports no longer taking the following meds (med scribe removed from PTA med list): Aspirin, Diazepam, Oxycodone, Senna-Docusate      Additional medication history information:   None    Medication reconciliation completed by provider prior to medication history? No    Time spent in this activity: 25 minutes    The information provided in this note is only as accurate as the sources available at the time of update(s)    Prior to Admission medications    Medication Sig Last Dose Taking? Auth Provider   BIOTIN PO Take 1 tablet by mouth daily 2/14/2022 at am Yes Reported, Patient   calcium-vitamin D (CALTRATE) 600-400 MG-UNIT per tablet Take 1 tablet by mouth daily 2/14/2022 at am Yes Reported, Patient   cyclobenzaprine (FLEXERIL) 10 MG tablet Take 10 mg by mouth 3 times daily as needed for muscle spasms  at prn Yes Reported, Patient   estradiol (VIVELLE-DOT) 0.1 MG/24HR bi-weekly patch Place 1 patch onto the skin twice a week (Wednedays & Sundays) 2/13/2022 at am Yes Reported, Patient   fish oil-omega-3 fatty acids 1000 MG capsule Take 1,000 mg by mouth daily  2/8/2022 at am Yes Reported, Patient   omeprazole (PRILOSEC) 20 MG DR capsule Take 20 mg by mouth daily as needed  at prn Yes Reported, Patient   progesterone (PROMETRIUM) 200 MG capsule Take 200 mg by mouth daily 2/14/2022 at pm Yes Reported, Patient   spironolactone (ALDACTONE) 50 MG tablet Take 50 mg by mouth every evening 2/14/2022 at pm Yes Reported, Patient   spironolactone (ALDACTONE) 50 MG tablet Take 100 mg by mouth every morning (2 x 50 mg) 2/14/2022 at am Yes Reported, Patient    traZODone (DESYREL) 50 MG tablet Take 50 mg by mouth nightly as needed for sleep  at prn Yes Reported, Patient   tretinoin (RETIN-A) 0.05 % cream Apply topically nightly as needed  at prn Yes Reported, Patient   VITAMIN D, CHOLECALCIFEROL, PO Take 1 tablet by mouth daily 2/14/2022 at am Yes Reported, Patient     Medication history completed by:    Pete Gutierrez CPhT  Medication [facility]

## 2022-02-15 ENCOUNTER — ANESTHESIA (OUTPATIENT)
Dept: SURGERY | Facility: CLINIC | Age: 52
End: 2022-02-15
Payer: COMMERCIAL

## 2022-02-15 ENCOUNTER — ANESTHESIA EVENT (OUTPATIENT)
Dept: SURGERY | Facility: CLINIC | Age: 52
End: 2022-02-15
Payer: COMMERCIAL

## 2022-02-15 ENCOUNTER — MEDICAL CORRESPONDENCE (OUTPATIENT)
Dept: HEALTH INFORMATION MANAGEMENT | Facility: CLINIC | Age: 52
End: 2022-02-15

## 2022-02-15 ENCOUNTER — HOSPITAL ENCOUNTER (OUTPATIENT)
Facility: CLINIC | Age: 52
Discharge: HOME | End: 2022-02-16
Attending: ORTHOPAEDIC SURGERY | Admitting: ORTHOPAEDIC SURGERY
Payer: COMMERCIAL

## 2022-02-15 ENCOUNTER — APPOINTMENT (OUTPATIENT)
Dept: GENERAL RADIOLOGY | Facility: CLINIC | Age: 52
End: 2022-02-15
Attending: PHYSICIAN ASSISTANT
Payer: COMMERCIAL

## 2022-02-15 ENCOUNTER — APPOINTMENT (OUTPATIENT)
Dept: PHYSICAL THERAPY | Facility: CLINIC | Age: 52
End: 2022-02-15
Attending: ORTHOPAEDIC SURGERY
Payer: COMMERCIAL

## 2022-02-15 DIAGNOSIS — Z98.890 POST-OPERATIVE STATE: Primary | ICD-10-CM

## 2022-02-15 PROBLEM — Z96.659 S/P TOTAL KNEE ARTHROPLASTY: Status: ACTIVE | Noted: 2022-02-15

## 2022-02-15 LAB
CREAT SERPL-MCNC: 0.74 MG/DL (ref 0.52–1.04)
GFR SERPL CREATININE-BSD FRML MDRD: >90 ML/MIN/1.73M2
POTASSIUM BLD-SCNC: 4.4 MMOL/L (ref 3.4–5.3)

## 2022-02-15 PROCEDURE — C1776 JOINT DEVICE (IMPLANTABLE): HCPCS | Performed by: ORTHOPAEDIC SURGERY

## 2022-02-15 PROCEDURE — 36415 COLL VENOUS BLD VENIPUNCTURE: CPT | Performed by: ANESTHESIOLOGY

## 2022-02-15 PROCEDURE — 999N000141 HC STATISTIC PRE-PROCEDURE NURSING ASSESSMENT: Performed by: ORTHOPAEDIC SURGERY

## 2022-02-15 PROCEDURE — 250N000009 HC RX 250: Performed by: ANESTHESIOLOGY

## 2022-02-15 PROCEDURE — 250N000011 HC RX IP 250 OP 636: Performed by: ANESTHESIOLOGY

## 2022-02-15 PROCEDURE — 278N000051 HC OR IMPLANT GENERAL: Performed by: ORTHOPAEDIC SURGERY

## 2022-02-15 PROCEDURE — 97530 THERAPEUTIC ACTIVITIES: CPT | Mod: GP

## 2022-02-15 PROCEDURE — 250N000009 HC RX 250: Performed by: NURSE ANESTHETIST, CERTIFIED REGISTERED

## 2022-02-15 PROCEDURE — 272N000001 HC OR GENERAL SUPPLY STERILE: Performed by: ORTHOPAEDIC SURGERY

## 2022-02-15 PROCEDURE — 250N000011 HC RX IP 250 OP 636: Performed by: NURSE ANESTHETIST, CERTIFIED REGISTERED

## 2022-02-15 PROCEDURE — 250N000011 HC RX IP 250 OP 636: Performed by: PHYSICIAN ASSISTANT

## 2022-02-15 PROCEDURE — 250N000009 HC RX 250: Performed by: ORTHOPAEDIC SURGERY

## 2022-02-15 PROCEDURE — 250N000011 HC RX IP 250 OP 636: Performed by: ORTHOPAEDIC SURGERY

## 2022-02-15 PROCEDURE — 258N000003 HC RX IP 258 OP 636: Performed by: ANESTHESIOLOGY

## 2022-02-15 PROCEDURE — 258N000003 HC RX IP 258 OP 636: Performed by: PHYSICIAN ASSISTANT

## 2022-02-15 PROCEDURE — 250N000013 HC RX MED GY IP 250 OP 250 PS 637: Performed by: PHYSICIAN ASSISTANT

## 2022-02-15 PROCEDURE — 97116 GAIT TRAINING THERAPY: CPT | Mod: GP

## 2022-02-15 PROCEDURE — 82565 ASSAY OF CREATININE: CPT | Performed by: ANESTHESIOLOGY

## 2022-02-15 PROCEDURE — 258N000003 HC RX IP 258 OP 636: Performed by: NURSE ANESTHETIST, CERTIFIED REGISTERED

## 2022-02-15 PROCEDURE — 360N000077 HC SURGERY LEVEL 4, PER MIN: Performed by: ORTHOPAEDIC SURGERY

## 2022-02-15 PROCEDURE — P9041 ALBUMIN (HUMAN),5%, 50ML: HCPCS | Performed by: ANESTHESIOLOGY

## 2022-02-15 PROCEDURE — 370N000017 HC ANESTHESIA TECHNICAL FEE, PER MIN: Performed by: ORTHOPAEDIC SURGERY

## 2022-02-15 PROCEDURE — 258N000001 HC RX 258: Performed by: ORTHOPAEDIC SURGERY

## 2022-02-15 PROCEDURE — 999N000063 XR KNEE PORT RIGHT 1/2 VIEWS: Mod: RT

## 2022-02-15 PROCEDURE — 84132 ASSAY OF SERUM POTASSIUM: CPT | Performed by: ANESTHESIOLOGY

## 2022-02-15 PROCEDURE — 97161 PT EVAL LOW COMPLEX 20 MIN: CPT | Mod: GP

## 2022-02-15 PROCEDURE — 99207 PR NO CHARGE LOS: CPT | Performed by: PHYSICIAN ASSISTANT

## 2022-02-15 PROCEDURE — 710N000009 HC RECOVERY PHASE 1, LEVEL 1, PER MIN: Performed by: ORTHOPAEDIC SURGERY

## 2022-02-15 DEVICE — IMP INSERT TIB S&N LGN PS HI FLEX XLPE SZ5-6 9MM 71453221: Type: IMPLANTABLE DEVICE | Site: KNEE | Status: FUNCTIONAL

## 2022-02-15 DEVICE — IMP BASEPLATE TIBIAL GENESIS II SZ 5 RT TI 71420186: Type: IMPLANTABLE DEVICE | Site: KNEE | Status: FUNCTIONAL

## 2022-02-15 DEVICE — IMPLANTABLE DEVICE: Type: IMPLANTABLE DEVICE | Site: KNEE | Status: FUNCTIONAL

## 2022-02-15 DEVICE — BONE CEMENT SIMPLEX FULL DOSE 6191-1-001: Type: IMPLANTABLE DEVICE | Site: KNEE | Status: FUNCTIONAL

## 2022-02-15 DEVICE — IMP COMP PATELLA GENESIS II 9X35MM 71420578: Type: IMPLANTABLE DEVICE | Site: KNEE | Status: FUNCTIONAL

## 2022-02-15 RX ORDER — PROPOFOL 10 MG/ML
INJECTION, EMULSION INTRAVENOUS PRN
Status: DISCONTINUED | OUTPATIENT
Start: 2022-02-15 | End: 2022-02-15

## 2022-02-15 RX ORDER — NALOXONE HYDROCHLORIDE 0.4 MG/ML
0.4 INJECTION, SOLUTION INTRAMUSCULAR; INTRAVENOUS; SUBCUTANEOUS
Status: DISCONTINUED | OUTPATIENT
Start: 2022-02-15 | End: 2022-02-16 | Stop reason: HOSPADM

## 2022-02-15 RX ORDER — SODIUM CHLORIDE, SODIUM LACTATE, POTASSIUM CHLORIDE, CALCIUM CHLORIDE 600; 310; 30; 20 MG/100ML; MG/100ML; MG/100ML; MG/100ML
INJECTION, SOLUTION INTRAVENOUS CONTINUOUS
Status: DISCONTINUED | OUTPATIENT
Start: 2022-02-15 | End: 2022-02-15 | Stop reason: HOSPADM

## 2022-02-15 RX ORDER — OXYCODONE HYDROCHLORIDE 5 MG/1
5 TABLET ORAL EVERY 4 HOURS PRN
Status: CANCELLED | OUTPATIENT
Start: 2022-02-15

## 2022-02-15 RX ORDER — ONDANSETRON 2 MG/ML
INJECTION INTRAMUSCULAR; INTRAVENOUS PRN
Status: DISCONTINUED | OUTPATIENT
Start: 2022-02-15 | End: 2022-02-15

## 2022-02-15 RX ORDER — ASPIRIN 81 MG/1
162 TABLET ORAL DAILY
Status: DISCONTINUED | OUTPATIENT
Start: 2022-02-15 | End: 2022-02-16 | Stop reason: HOSPADM

## 2022-02-15 RX ORDER — BISACODYL 10 MG
10 SUPPOSITORY, RECTAL RECTAL DAILY PRN
Status: DISCONTINUED | OUTPATIENT
Start: 2022-02-15 | End: 2022-02-16 | Stop reason: HOSPADM

## 2022-02-15 RX ORDER — SPIRONOLACTONE 25 MG/1
50 TABLET ORAL EVERY EVENING
Status: DISCONTINUED | OUTPATIENT
Start: 2022-02-16 | End: 2022-02-16 | Stop reason: HOSPADM

## 2022-02-15 RX ORDER — DIPHENHYDRAMINE HCL 25 MG
25 CAPSULE ORAL EVERY 6 HOURS PRN
Status: DISCONTINUED | OUTPATIENT
Start: 2022-02-15 | End: 2022-02-16 | Stop reason: HOSPADM

## 2022-02-15 RX ORDER — NALOXONE HYDROCHLORIDE 0.4 MG/ML
0.2 INJECTION, SOLUTION INTRAMUSCULAR; INTRAVENOUS; SUBCUTANEOUS
Status: DISCONTINUED | OUTPATIENT
Start: 2022-02-15 | End: 2022-02-16 | Stop reason: HOSPADM

## 2022-02-15 RX ORDER — SPIRONOLACTONE 25 MG/1
100 TABLET ORAL EVERY MORNING
Status: DISCONTINUED | OUTPATIENT
Start: 2022-02-16 | End: 2022-02-16 | Stop reason: HOSPADM

## 2022-02-15 RX ORDER — CHLOROPROCAINE HYDROCHLORIDE 20 MG/ML
INJECTION, SOLUTION EPIDURAL; INFILTRATION; INTRACAUDAL; PERINEURAL
Status: COMPLETED | OUTPATIENT
Start: 2022-02-15 | End: 2022-02-15

## 2022-02-15 RX ORDER — OXYCODONE HYDROCHLORIDE 5 MG/1
10 TABLET ORAL EVERY 4 HOURS PRN
Status: DISCONTINUED | OUTPATIENT
Start: 2022-02-15 | End: 2022-02-16 | Stop reason: HOSPADM

## 2022-02-15 RX ORDER — ALBUMIN, HUMAN INJ 5% 5 %
250 SOLUTION INTRAVENOUS ONCE
Status: COMPLETED | OUTPATIENT
Start: 2022-02-15 | End: 2022-02-15

## 2022-02-15 RX ORDER — CELECOXIB 200 MG/1
200 CAPSULE ORAL DAILY
Qty: 40 CAPSULE | Refills: 0 | Status: SHIPPED | OUTPATIENT
Start: 2022-02-15 | End: 2022-03-27

## 2022-02-15 RX ORDER — MAGNESIUM HYDROXIDE 1200 MG/15ML
LIQUID ORAL PRN
Status: DISCONTINUED | OUTPATIENT
Start: 2022-02-15 | End: 2022-02-15 | Stop reason: HOSPADM

## 2022-02-15 RX ORDER — AMOXICILLIN 250 MG
1 CAPSULE ORAL 2 TIMES DAILY
Status: DISCONTINUED | OUTPATIENT
Start: 2022-02-15 | End: 2022-02-16 | Stop reason: HOSPADM

## 2022-02-15 RX ORDER — ONDANSETRON 4 MG/1
4 TABLET, ORALLY DISINTEGRATING ORAL EVERY 30 MIN PRN
Status: DISCONTINUED | OUTPATIENT
Start: 2022-02-15 | End: 2022-02-15 | Stop reason: HOSPADM

## 2022-02-15 RX ORDER — HYDROMORPHONE HCL IN WATER/PF 6 MG/30 ML
0.2 PATIENT CONTROLLED ANALGESIA SYRINGE INTRAVENOUS
Status: DISCONTINUED | OUTPATIENT
Start: 2022-02-15 | End: 2022-02-16 | Stop reason: HOSPADM

## 2022-02-15 RX ORDER — TRETINOIN 0.5 MG/G
CREAM TOPICAL
Status: DISCONTINUED | OUTPATIENT
Start: 2022-02-15 | End: 2022-02-16 | Stop reason: HOSPADM

## 2022-02-15 RX ORDER — CLINDAMYCIN PHOSPHATE 900 MG/50ML
900 INJECTION, SOLUTION INTRAVENOUS
Status: COMPLETED | OUTPATIENT
Start: 2022-02-15 | End: 2022-02-15

## 2022-02-15 RX ORDER — ONDANSETRON 4 MG/1
4 TABLET, ORALLY DISINTEGRATING ORAL EVERY 6 HOURS PRN
Status: DISCONTINUED | OUTPATIENT
Start: 2022-02-15 | End: 2022-02-16 | Stop reason: HOSPADM

## 2022-02-15 RX ORDER — OXYCODONE HYDROCHLORIDE 5 MG/1
5 TABLET ORAL EVERY 4 HOURS PRN
Status: DISCONTINUED | OUTPATIENT
Start: 2022-02-15 | End: 2022-02-16 | Stop reason: HOSPADM

## 2022-02-15 RX ORDER — PANTOPRAZOLE SODIUM 40 MG/1
40 TABLET, DELAYED RELEASE ORAL DAILY PRN
Status: DISCONTINUED | OUTPATIENT
Start: 2022-02-15 | End: 2022-02-16 | Stop reason: HOSPADM

## 2022-02-15 RX ORDER — LIDOCAINE 40 MG/G
CREAM TOPICAL
Status: DISCONTINUED | OUTPATIENT
Start: 2022-02-15 | End: 2022-02-16 | Stop reason: HOSPADM

## 2022-02-15 RX ORDER — EPHEDRINE SULFATE 50 MG/ML
INJECTION, SOLUTION INTRAMUSCULAR; INTRAVENOUS; SUBCUTANEOUS PRN
Status: DISCONTINUED | OUTPATIENT
Start: 2022-02-15 | End: 2022-02-15

## 2022-02-15 RX ORDER — PROPOFOL 10 MG/ML
INJECTION, EMULSION INTRAVENOUS CONTINUOUS PRN
Status: DISCONTINUED | OUTPATIENT
Start: 2022-02-15 | End: 2022-02-15

## 2022-02-15 RX ORDER — LIDOCAINE HYDROCHLORIDE 20 MG/ML
INJECTION, SOLUTION INFILTRATION; PERINEURAL PRN
Status: DISCONTINUED | OUTPATIENT
Start: 2022-02-15 | End: 2022-02-15

## 2022-02-15 RX ORDER — AMOXICILLIN 250 MG
1-2 CAPSULE ORAL 2 TIMES DAILY
Qty: 100 TABLET | Refills: 0 | Status: SHIPPED | OUTPATIENT
Start: 2022-02-15

## 2022-02-15 RX ORDER — CLINDAMYCIN PHOSPHATE 900 MG/50ML
900 INJECTION, SOLUTION INTRAVENOUS SEE ADMIN INSTRUCTIONS
Status: DISCONTINUED | OUTPATIENT
Start: 2022-02-15 | End: 2022-02-15 | Stop reason: HOSPADM

## 2022-02-15 RX ORDER — FENTANYL CITRATE 0.05 MG/ML
50 INJECTION, SOLUTION INTRAMUSCULAR; INTRAVENOUS EVERY 5 MIN PRN
Status: DISCONTINUED | OUTPATIENT
Start: 2022-02-15 | End: 2022-02-15 | Stop reason: HOSPADM

## 2022-02-15 RX ORDER — HYDROXYZINE HYDROCHLORIDE 25 MG/1
25 TABLET, FILM COATED ORAL EVERY 6 HOURS PRN
Status: DISCONTINUED | OUTPATIENT
Start: 2022-02-15 | End: 2022-02-16 | Stop reason: HOSPADM

## 2022-02-15 RX ORDER — CLINDAMYCIN PHOSPHATE 600 MG/50ML
600 INJECTION, SOLUTION INTRAVENOUS EVERY 8 HOURS
Status: COMPLETED | OUTPATIENT
Start: 2022-02-15 | End: 2022-02-16

## 2022-02-15 RX ORDER — ACETAMINOPHEN 325 MG/1
975 TABLET ORAL 3 TIMES DAILY
Status: DISCONTINUED | OUTPATIENT
Start: 2022-02-15 | End: 2022-02-16 | Stop reason: HOSPADM

## 2022-02-15 RX ORDER — TRANEXAMIC ACID 650 MG/1
1950 TABLET ORAL ONCE
Status: COMPLETED | OUTPATIENT
Start: 2022-02-15 | End: 2022-02-15

## 2022-02-15 RX ORDER — ACETAMINOPHEN 325 MG/1
650 TABLET ORAL EVERY 4 HOURS PRN
Qty: 100 TABLET | Refills: 0 | Status: SHIPPED | OUTPATIENT
Start: 2022-02-15

## 2022-02-15 RX ORDER — POLYETHYLENE GLYCOL 3350 17 G/17G
17 POWDER, FOR SOLUTION ORAL DAILY
Status: DISCONTINUED | OUTPATIENT
Start: 2022-02-16 | End: 2022-02-16 | Stop reason: HOSPADM

## 2022-02-15 RX ORDER — CELECOXIB 100 MG/1
100 CAPSULE ORAL 2 TIMES DAILY
Status: DISCONTINUED | OUTPATIENT
Start: 2022-02-15 | End: 2022-02-16 | Stop reason: HOSPADM

## 2022-02-15 RX ORDER — ASPIRIN 81 MG/1
162 TABLET ORAL DAILY
Qty: 120 TABLET | Refills: 0 | Status: SHIPPED | OUTPATIENT
Start: 2022-02-15

## 2022-02-15 RX ORDER — ONDANSETRON 2 MG/ML
4 INJECTION INTRAMUSCULAR; INTRAVENOUS EVERY 6 HOURS PRN
Status: DISCONTINUED | OUTPATIENT
Start: 2022-02-15 | End: 2022-02-16 | Stop reason: HOSPADM

## 2022-02-15 RX ORDER — CYCLOBENZAPRINE HCL 10 MG
10 TABLET ORAL 3 TIMES DAILY PRN
Status: DISCONTINUED | OUTPATIENT
Start: 2022-02-15 | End: 2022-02-16 | Stop reason: HOSPADM

## 2022-02-15 RX ORDER — VANCOMYCIN HYDROCHLORIDE 1 G/20ML
INJECTION, POWDER, LYOPHILIZED, FOR SOLUTION INTRAVENOUS PRN
Status: DISCONTINUED | OUTPATIENT
Start: 2022-02-15 | End: 2022-02-15 | Stop reason: HOSPADM

## 2022-02-15 RX ORDER — ONDANSETRON 2 MG/ML
4 INJECTION INTRAMUSCULAR; INTRAVENOUS EVERY 30 MIN PRN
Status: DISCONTINUED | OUTPATIENT
Start: 2022-02-15 | End: 2022-02-15 | Stop reason: HOSPADM

## 2022-02-15 RX ORDER — MEPERIDINE HYDROCHLORIDE 25 MG/ML
12.5 INJECTION INTRAMUSCULAR; INTRAVENOUS; SUBCUTANEOUS EVERY 5 MIN PRN
Status: DISCONTINUED | OUTPATIENT
Start: 2022-02-15 | End: 2022-02-15 | Stop reason: HOSPADM

## 2022-02-15 RX ORDER — SODIUM CHLORIDE, SODIUM LACTATE, POTASSIUM CHLORIDE, CALCIUM CHLORIDE 600; 310; 30; 20 MG/100ML; MG/100ML; MG/100ML; MG/100ML
INJECTION, SOLUTION INTRAVENOUS CONTINUOUS
Status: DISCONTINUED | OUTPATIENT
Start: 2022-02-15 | End: 2022-02-16 | Stop reason: HOSPADM

## 2022-02-15 RX ORDER — HYDROMORPHONE HCL IN WATER/PF 6 MG/30 ML
0.4 PATIENT CONTROLLED ANALGESIA SYRINGE INTRAVENOUS
Status: DISCONTINUED | OUTPATIENT
Start: 2022-02-15 | End: 2022-02-16 | Stop reason: HOSPADM

## 2022-02-15 RX ORDER — PROCHLORPERAZINE MALEATE 10 MG
10 TABLET ORAL EVERY 6 HOURS PRN
Status: DISCONTINUED | OUTPATIENT
Start: 2022-02-15 | End: 2022-02-16 | Stop reason: HOSPADM

## 2022-02-15 RX ORDER — OXYCODONE HYDROCHLORIDE 5 MG/1
5-10 TABLET ORAL EVERY 4 HOURS PRN
Qty: 50 TABLET | Refills: 0 | Status: SHIPPED | OUTPATIENT
Start: 2022-02-15

## 2022-02-15 RX ORDER — HYDROMORPHONE HCL IN WATER/PF 6 MG/30 ML
0.4 PATIENT CONTROLLED ANALGESIA SYRINGE INTRAVENOUS EVERY 5 MIN PRN
Status: DISCONTINUED | OUTPATIENT
Start: 2022-02-15 | End: 2022-02-15 | Stop reason: HOSPADM

## 2022-02-15 RX ORDER — ACETAMINOPHEN 325 MG/1
975 TABLET ORAL ONCE
Status: COMPLETED | OUTPATIENT
Start: 2022-02-15 | End: 2022-02-15

## 2022-02-15 RX ADMIN — CYCLOBENZAPRINE 10 MG: 10 TABLET, FILM COATED ORAL at 23:12

## 2022-02-15 RX ADMIN — CLINDAMYCIN PHOSPHATE 600 MG: 600 INJECTION, SOLUTION INTRAVENOUS at 21:10

## 2022-02-15 RX ADMIN — ROPIVACAINE HYDROCHLORIDE 15 ML: 5 INJECTION, SOLUTION EPIDURAL; INFILTRATION; PERINEURAL at 10:35

## 2022-02-15 RX ADMIN — Medication 5 MG: at 12:21

## 2022-02-15 RX ADMIN — PROPOFOL 100 MCG/KG/MIN: 10 INJECTION, EMULSION INTRAVENOUS at 11:42

## 2022-02-15 RX ADMIN — MIDAZOLAM 2 MG: 1 INJECTION INTRAMUSCULAR; INTRAVENOUS at 10:46

## 2022-02-15 RX ADMIN — ONDANSETRON 4 MG: 2 INJECTION INTRAMUSCULAR; INTRAVENOUS at 12:50

## 2022-02-15 RX ADMIN — HYDROXYZINE HYDROCHLORIDE 25 MG: 25 TABLET ORAL at 21:56

## 2022-02-15 RX ADMIN — Medication 5 MG: at 12:45

## 2022-02-15 RX ADMIN — CHLOROPROCAINE HYDROCHLORIDE 60 MG: 20 INJECTION, SOLUTION EPIDURAL; INFILTRATION; INTRACAUDAL; PERINEURAL at 11:42

## 2022-02-15 RX ADMIN — ACETAMINOPHEN 975 MG: 325 TABLET, FILM COATED ORAL at 15:00

## 2022-02-15 RX ADMIN — SENNOSIDES AND DOCUSATE SODIUM 1 TABLET: 50; 8.6 TABLET ORAL at 20:31

## 2022-02-15 RX ADMIN — ACETAMINOPHEN 1000 MG: 500 TABLET ORAL at 10:01

## 2022-02-15 RX ADMIN — SODIUM CHLORIDE, POTASSIUM CHLORIDE, SODIUM LACTATE AND CALCIUM CHLORIDE: 600; 310; 30; 20 INJECTION, SOLUTION INTRAVENOUS at 14:41

## 2022-02-15 RX ADMIN — HYDROMORPHONE HYDROCHLORIDE 0.4 MG: 0.2 INJECTION, SOLUTION INTRAMUSCULAR; INTRAVENOUS; SUBCUTANEOUS at 14:08

## 2022-02-15 RX ADMIN — CELECOXIB 100 MG: 100 CAPSULE ORAL at 20:31

## 2022-02-15 RX ADMIN — LIDOCAINE HYDROCHLORIDE 50 MG: 20 INJECTION, SOLUTION INFILTRATION; PERINEURAL at 11:46

## 2022-02-15 RX ADMIN — OXYCODONE HYDROCHLORIDE 5 MG: 5 TABLET ORAL at 19:58

## 2022-02-15 RX ADMIN — LIDOCAINE HYDROCHLORIDE 1 ML: 10 INJECTION, SOLUTION EPIDURAL; INFILTRATION; INTRACAUDAL; PERINEURAL at 10:47

## 2022-02-15 RX ADMIN — PHENYLEPHRINE HYDROCHLORIDE 0.3 MCG/KG/MIN: 10 INJECTION INTRAVENOUS at 11:43

## 2022-02-15 RX ADMIN — Medication 5 MG: at 11:57

## 2022-02-15 RX ADMIN — OXYCODONE HYDROCHLORIDE 5 MG: 5 TABLET ORAL at 15:00

## 2022-02-15 RX ADMIN — ACETAMINOPHEN 975 MG: 325 TABLET, FILM COATED ORAL at 21:55

## 2022-02-15 RX ADMIN — PROPOFOL 30 MG: 10 INJECTION, EMULSION INTRAVENOUS at 11:46

## 2022-02-15 RX ADMIN — SODIUM CHLORIDE, POTASSIUM CHLORIDE, SODIUM LACTATE AND CALCIUM CHLORIDE: 600; 310; 30; 20 INJECTION, SOLUTION INTRAVENOUS at 12:32

## 2022-02-15 RX ADMIN — TRANEXAMIC ACID 1950 MG: 650 TABLET ORAL at 10:01

## 2022-02-15 RX ADMIN — MIDAZOLAM 2 MG: 1 INJECTION INTRAMUSCULAR; INTRAVENOUS at 11:35

## 2022-02-15 RX ADMIN — ALBUMIN HUMAN 250 ML: 0.05 INJECTION, SOLUTION INTRAVENOUS at 13:46

## 2022-02-15 RX ADMIN — CLINDAMYCIN PHOSPHATE 900 MG: 900 INJECTION, SOLUTION INTRAVENOUS at 10:49

## 2022-02-15 RX ADMIN — Medication 5 MG: at 12:01

## 2022-02-15 RX ADMIN — SODIUM CHLORIDE, POTASSIUM CHLORIDE, SODIUM LACTATE AND CALCIUM CHLORIDE: 600; 310; 30; 20 INJECTION, SOLUTION INTRAVENOUS at 10:47

## 2022-02-15 ASSESSMENT — ENCOUNTER SYMPTOMS
DYSRHYTHMIAS: 0
SEIZURES: 0

## 2022-02-15 ASSESSMENT — MIFFLIN-ST. JEOR: SCORE: 1370.66

## 2022-02-15 ASSESSMENT — LIFESTYLE VARIABLES: TOBACCO_USE: 0

## 2022-02-15 NOTE — PLAN OF CARE
Patient vital signs are at baseline: No - BP soft  Patient able to ambulate as they were prior to admission or with assist devices provided by therapies during their stay:  NO - PT pending  Patient MUST void prior to discharge:  Yes  Patient able to tolerate oral intake:  Yes  Pain has adequate pain control using Oral analgesics:  Yes     Pt up from PACU @ 1340.  A&O, Voiding BSC, PT pending.  Plan for 11 AM discharge with  Kendy

## 2022-02-15 NOTE — PROGRESS NOTES
02/15/22 1700   Quick Adds   Type of Visit Initial PT Evaluation   Living Environment   People in home spouse   Current Living Arrangements house  (single level )   Home Accessibility stairs within home   Number of Stairs, Main Entrance 1   Stair Railings, Main Entrance none   Number of Stairs, Within Home, Primary other (see comments)  (stairs to basement, will not need to use)   Stair Railings, Within Home, Primary railings safe and in good condition   Transportation Anticipated family or friend will provide   Living Environment Comments Pt's mother lives at home, will be around 24/7 for assist    Self-Care   Usual Activity Tolerance good   Current Activity Tolerance moderate   Regular Exercise Yes   Activity/Exercise Type strength training;walking   Equipment Currently Used at Home none   Activity/Exercise/Self-Care Comment pt was previously ind with all mobility, ADL's and IADL's    Disability/Function   Fall history within last six months yes   Number of times patient has fallen within last six months 1  (fell on ice getting out of truck)   Change in Functional Status Since Onset of Current Illness/Injury yes   General Information   Onset of Illness/Injury or Date of Surgery 02/15/22   Referring Physician Tone Campbell, ALYSSA   Patient/Family Therapy Goals Statement (PT) return home with assist from spouse and mother   Pertinent History of Current Problem (include personal factors and/or comorbidities that impact the POC) Pt is a 51 y.o. female s/p R TKA on 2/15/2022, POD#0   Existing Precautions/Restrictions fall   Weight-Bearing Status - RLE weight-bearing as tolerated   Cognition   Orientation Status (Cognition) oriented x 4   Affect/Mental Status (Cognition) WNL   Follows Commands (Cognition) WNL   Pain Assessment   Patient Currently in Pain   (pain in R knee 1/10)   Posture    Posture Not impaired   Range of Motion (ROM)   ROM Quick Adds ROM deficits secondary to surgical procedure   ROM Comment  deficits with R knee secondary to surgery, otherwise grossly WFL    Strength   Manual Muscle Testing Quick Adds Able to perform R SLR;Able to perform L SLR;Deficits observed during functional mobility   Strength Comments not formally assessed, deficits with RLE secondary to surgery, appears grossly antigravity   Bed Mobility   Comment (Bed Mobility) supine<>sit Ind    Transfers   Transfer Safety Comments sit<>stand CGA   Gait/Stairs (Locomotion)   Haiku Level (Gait) contact guard   Assistive Device (Gait) walker, front-wheeled   Distance in Feet (Required for LE Total Joints) 10' + 550'    Pattern (Gait) step-through   Comment (Gait/Stairs) amb with FWW and CGA   Balance   Balance Comments sitting EOB ind, good standing balance in FWW, deficits with dynamic balance secondary to surgery   Sensory Examination   Sensory Perception Comments pt reports tingling in R foot since surgery   Clinical Impression   Criteria for Skilled Therapeutic Intervention yes, treatment indicated   PT Diagnosis (PT) impaired gait   Influenced by the following impairments deficits with functional ROM, strength and balance, pain    Functional limitations due to impairments decreased ind with all functional mobility, decreased activity tolerance, fall risk    Clinical Presentation Stable/Uncomplicated   Clinical Presentation Rationale PMH and clinical presentation, clinical judgement   Clinical Decision Making (Complexity) low complexity   Therapy Frequency (PT) 2x/day   Predicted Duration of Therapy Intervention (days/wks) 2 days   Planned Therapy Interventions (PT) balance training;bed mobility training;cryotherapy;gait training;home exercise program;patient/family education;ROM (range of motion);stair training;strengthening;stretching;transfer training;progressive activity/exercise   Anticipated Equipment Needs at Discharge (PT)   (pt will provide)   Risk & Benefits of therapy have been explained evaluation/treatment results  reviewed;care plan/treatment goals reviewed;risks/benefits reviewed;current/potential barriers reviewed;participants voiced agreement with care plan;participants included;patient;spouse/significant other   PT Discharge Planning    PT Discharge Recommendation (DC Rec)   (defer to ortho)   PT Rationale for DC Rec Pt is currently below baseline but was moving well. Anticipate at time of discharge pt will be SBA using FWW for amb, sit<>stand with FWW and all functional transfers SBA, ind with bed mobility. Pt has been using safe and effective strategy with all mobility. Pt lives with mom who will be home 24/7 for help, spouse also available to help as needed    PT Brief overview of current status  bed mobility ind, sit<>stand with FWW CGA, amb with FWW and CGA   Therapy Certification   Start of care date 02/15/22   Certification date from 02/15/22   Certification date to 02/22/22   Medical Diagnosis Right TKA    Total Evaluation Time   Total Evaluation Time (Minutes) 10

## 2022-02-15 NOTE — ANESTHESIA PROCEDURE NOTES
Intrathecal Procedure Note    Pre-Procedure   Staff -        Anesthesiologist:  Kemal Cameron MD       Performed By: anesthesiologist       Location: OR       Pre-Anesthestic Checklist: patient identified, IV checked, site marked, risks and benefits discussed, informed consent, monitors and equipment checked and pre-op evaluation  Timeout:       Correct Patient: Yes        Correct Procedure: Yes        Correct Site: Yes        Correct Position: Yes   Procedure Documentation  Procedure: intrathecal       Patient Position: sitting       Patient Prep/Sterile Barriers: sterile gloves, mask, patient draped       Skin prep: Betadine       Insertion Site: L4-5. (midline approach).       Needle Gauge: 24.        Needle Length (Inches): 3.5        Spinal Needle Type: Rosalinda-Devin       Introducer used       Introducer: 20 G      # of attempts: 1 and  # of redirects:     Assessment/Narrative         Paresthesias: No.       CSF fluid: clear.      Opening pressure was cmH2O while  Sitting.      Medication(s) Administered   2% Chloroprocaine PF (Intrathecal), 60 mg

## 2022-02-15 NOTE — ANESTHESIA CARE TRANSFER NOTE
Patient: Chanelle Allen    Procedure: Procedure(s):  RIGHT OPEN TOTAL KNEE ARTHROPLASTY; hardware removal       Diagnosis: Osteoarthritis of right knee, unspecified osteoarthritis type [M17.11]  Diagnosis Additional Information: No value filed.    Anesthesia Type:   Spinal     Note:    Oropharynx: oropharynx clear of all foreign objects and spontaneously breathing  Level of Consciousness: drowsy  Oxygen Supplementation: face mask  Level of Supplemental Oxygen (L/min / FiO2): 6  Independent Airway: airway patency satisfactory and stable  Dentition: dentition unchanged  Vital Signs Stable: post-procedure vital signs reviewed and stable  Report to RN Given: handoff report given  Patient transferred to: PACU  Comments: At end of procedure, spontaneous respirations, patient alert to voice, able to follow commands. Oxygen via facemask at 6 liters per minute to PACU. Oxygen tubing connected to wall O2 in PACU, SpO2, NiBP, and EKG monitors and alarms on and functioning, Cele Hugger warmer connected to patient gown, report on patient's clinical status given to PACU RN, RN questions answered.  Handoff Report: Identifed the Patient, Identified the Reponsible Provider, Reviewed the pertinent medical history, Discussed the surgical course, Reviewed Intra-OP anesthesia mangement and issues during anesthesia, Set expectations for post-procedure period and Allowed opportunity for questions and acknowledgement of understanding      Vitals:  Vitals Value Taken Time   BP     Temp     Pulse     Resp     SpO2         Electronically Signed By: MORGAN Young CRNA  February 15, 2022  1:16 PM

## 2022-02-15 NOTE — ANESTHESIA PREPROCEDURE EVALUATION
Anesthesia Pre-Procedure Evaluation    Patient: Chanelle Allen   MRN: [id number] : 1970        Preoperative Diagnosis: Osteoarthritis of right knee, unspecified osteoarthritis type [M17.11]    Procedure : Procedure(s):  RIGHT OPEN TOTAL KNEE ARTHROPLASTY          History reviewed. No pertinent past medical history.   Past Surgical History:   Procedure Laterality Date     ARTHROPLASTY KNEE Left 2017    Procedure: ARTHROPLASTY KNEE;  LEFT TOTAL KNEE ARTHROPLASTY (TAI AND NEPHEW)^ LEFT KNEE HARDWARE REMOVAL; BONE GRAFTING TO THE TIBIAL DEFECT;  Surgeon: Rick Kemp MD;  Location:  OR     BREAST SURGERY      breast implants      BREAST SURGERY      breasst reduction     COSMETIC SURGERY      breast implants removed     GRAFT BONE TO TIBIA Left 2017    Procedure: GRAFT BONE TO TIBIA;;  Surgeon: Rick Kemp MD;  Location:  OR     GYN SURGERY      2      GYN SURGERY      tubal ligation     ORTHOPEDIC SURGERY      acl     REMOVE HARDWARE KNEE Left 2017    Procedure: REMOVE HARDWARE KNEE;;  Surgeon: Rick Kemp MD;  Location:  OR     TONSILLECTOMY        Allergies   Allergen Reactions     Amoxicillin Hives     Cats Itching     Morphine Itching     Sulfa Drugs Hives     Latex Rash      Social History     Tobacco Use     Smoking status: Former Smoker     Packs/day: 1.00     Years: 18.00     Pack years: 18.00     Types: Cigarettes     Quit date: 2001     Years since quittin.1     Smokeless tobacco: Not on file   Substance Use Topics     Alcohol use: Yes     Comment: occasional      Wt Readings from Last 1 Encounters:   02/15/22 76 kg (167 lb 8 oz)        Anesthesia Evaluation   Pt has had prior anesthetic. Type: General.    No history of anesthetic complications       ROS/MED HX  ENT/Pulmonary:    (-) tobacco use, asthma and sleep apnea   Neurologic:     (+) no peripheral neuropathy  (-) no seizures and no CVA   Cardiovascular:    (-) hypertension, CAD and  arrhythmias   METS/Exercise Tolerance:     Hematologic:       Musculoskeletal:   (+) arthritis,     GI/Hepatic:    (-) GERD   Renal/Genitourinary:    (-) renal disease   Endo:    (-) Type II DM and thyroid disease   Psychiatric/Substance Use:       Infectious Disease:    (-) Recent Fever   Malignancy:       Other:            Physical Exam    Airway  airway exam normal      Mallampati: I   TM distance: > 3 FB   Neck ROM: full   Mouth opening: > 3 cm    Respiratory Devices and Support         Dental  no notable dental history         Cardiovascular   cardiovascular exam normal          Pulmonary   pulmonary exam normal                OUTSIDE LABS:  CBC:   Lab Results   Component Value Date    HGB 11.1 (L) 06/25/2017    HGB 10.9 (L) 06/24/2017     BMP:   Lab Results   Component Value Date     (H) 06/25/2017    GLC 99 06/24/2017     COAGS: No results found for: PTT, INR, FIBR  POC: No results found for: BGM, HCG, HCGS  HEPATIC: No results found for: ALBUMIN, PROTTOTAL, ALT, AST, GGT, ALKPHOS, BILITOTAL, BILIDIRECT, KRISTEN  OTHER: No results found for: PH, LACT, A1C, KASSIE, PHOS, MAG, LIPASE, AMYLASE, TSH, T4, T3, CRP, SED    Anesthesia Plan    ASA Status:  1   NPO Status:  NPO Appropriate    Anesthesia Type: Spinal.              Consents    Anesthesia Plan(s) and associated risks, benefits, and realistic alternatives discussed. Questions answered and patient/representative(s) expressed understanding.    - Discussed:     - Discussed with:  Patient         Postoperative Care    Pain management: IV analgesics, Oral pain medications, Peripheral nerve block (Single Shot).   PONV prophylaxis: Ondansetron (or other 5HT-3)     Comments:    Other Comments: Discussed spinal vs GA. Had spinals for c/s, attempted for last TKA but unsuccessful. Interested in trying spinal again.            Kemal Cameron MD

## 2022-02-15 NOTE — ANESTHESIA POSTPROCEDURE EVALUATION
Patient: Chanelle Allen    Procedure: Procedure(s):  RIGHT OPEN TOTAL KNEE ARTHROPLASTY; hardware removal       Diagnosis:Osteoarthritis of right knee, unspecified osteoarthritis type [M17.11]  Diagnosis Additional Information: No value filed.    Anesthesia Type:  Spinal    Note:  Disposition: Admission   Postop Pain Control: Uneventful            Sign Out: Well controlled pain   PONV: No   Neuro/Psych: Uneventful            Sign Out: spinal wearing off.   Airway/Respiratory: Uneventful            Sign Out: Acceptable/Baseline resp. status   CV/Hemodynamics: Uneventful            Sign Out: Acceptable CV status   Other NRE: NONE   DID A NON-ROUTINE EVENT OCCUR? No    Event details/Postop Comments:  Mild bilateral eye irritation bilaterally - some redness. Denies changes in vision, has steadily improved. May be exposure keratopathy, less likely to be bilateral corneal abrasions. Discussed with patient that this should continue to improve over the next day.           Last vitals:  Vitals Value Taken Time   /59 02/15/22 1410   Temp 36.8  C (98.2  F) 02/15/22 1320   Pulse 84 02/15/22 1414   Resp 12 02/15/22 1417   SpO2 100 % 02/15/22 1416   Vitals shown include unvalidated device data.    Electronically Signed By: Kemal Cameron MD  February 15, 2022  3:23 PM

## 2022-02-15 NOTE — PROCEDURES
DATE OF SERVICE: 2/15/2022    SURGEON   BURKE KRISHNAMURTHY MD     FIRST ASSISTANT   FIORELLA CAMPBELL PA-C   Please bill for Mr. Campbell as first assistant as there was no resident available to assist in this case. Assistants were necessary and performed positioning, draping, retraction, suturing and wound dressing tasks throughout the surgical procedure. The assistant was present for the entire procedure.    ASSISTANTS  SUAD Harrison     PREOPERATIVE DIAGNOSIS   Endstage right knee tricompartmental osteoarthritis.     POSTOPERATIVE DIAGNOSIS   Endstage right knee tricompartmental osteoarthritis.     PROCEDURE   Right  total knee arthroplasty using [employer] components, a size 4 Legion posterior stabilized femur, a size 5 tibia, a size 35 mm round patella, and a size 9 posterior stabilized polyethylene insert.   Right distal femoral screw removal.    INDICATIONS FOR SURGERY   Chanelle Allen [id number] is a 51 year old-year-old female with a long history of right knee pain. The patient had failed all of the conservative and reasonable options. After discussing with the patient, it was decided that they would benefit from the above-mentioned procedure. Informed consent was obtained.     ANESTHESIA   Spinal block with an abductor canal block.     FINDINGS   There was tricompartmental osteoarthritis. Ligaments were intact otherwise.     DESCRIPTION OF PROCEDURE   Chanelle Allen was correctly identified by myself in the PAR and their right lower extremity was marked. A single-shot adductor canal block was placed. The patient was then taken to the operating room where a spinal anesthetic was placed. The patient was placed supine. A tourniquet was placed around the right proximal thigh. A bump was placed underneath the right hip. The patient was then prepped with Avagard, followed by a 10 minute Betadine scrub, alcohol paint, DuraPrep and then draped in the usual fashion. A timeout was performed. The tourniquet was  then inflated to 300 mmHg. The incision was made just medial to the patella for the mini sub-vastus approach after injection with the anesthetic solution, and carried down with sharp dissection. The vastus medialis was then released from its bed and swept laterally. The fat pad excision was performed at this time, as well as a medial release. The knee was then brought into flexion. The anterior horns of the medial and lateral menisci were excised, as well as the ACL and PCL. A PCL retractor was placed. The external tibial cutting guide was placed at 0 degrees slope. This was pinned with a planned 2 mm cut off the medial side and the cut was made with an oscillating saw. This was sized to a(n) 5, pinned in place, and we had excellent alignment with an alignment maria r. The tibia was reamed and punched in the usual fashion and the trial tibial component was removed. Attention was next turned to the femur. An intramedullary hole was drilled and we placed the distal cutting guide at 5 degrees of valgus. The block was pinned into place and the distal cut was made in the usual fashion. We then placed the AP sizing guide and it was felt that a size 4 would give us the best fit. This was placed in 3 degrees of external rotation which matched up nicely with [place]'s line as well as the epicondylar access. The 4-in-1 cutting block was pinned into place. The anterior, posterior and chamfer cuts were then made in the usual fashion. The femoral screw was impeding full seating of the box.  The screw was fully visualized and removed. The trial femoral component was placed and the box cut was made with the reamer and box chisel in the usual fashion. At this point, we trialed the knee. We placed a 9 mm spacer. We had excellent balance throughout. The tibial trial component was removed after it was marked in proper alignment. The patella was then clamped with 2 towel clips. An oscillating saw was used to make a flat cut. The patella  was sized to a round 35 mm patella, the lug holes were drilled and then undermined in the usual fashion with a curette. The trial patella was placed. It fit nicely and sat down completely. All trial components were removed at this point. All bony surfaces were drilled with a small 3 mm drill bit and then thoroughly irrigated and dried. The rest of the local anesthetic was injected into the posterior capsule. Simplex cement mixed with tobramycin was then placed on the tibia and tibial component. The tibial component was impacted in place and the excess cement was removed. Next, cement was placed on the femoral component and on the femur and the femoral component was impacted into place. The excess cement was removed. A size 9 spacer was placed and the knee fully extended. The patella was then irrigated, dried and cement was placed onto the patella and patellar component. The patellar component was clamped in place and the excess cement removed. The knee was then lavaged with a dilute Betadine solution for 3 minutes and then irrigated again with normal saline to remove all the Betadine. The permanent 9 mm spacer was inserted with the  tool. The patella was relocated, a gram of vancomycin powder was placed in the joint, the tourniquet was deflated, and then the retinaculum was closed with a running #2 Quill suture. The subdermal layer was closed with a 0 Stratafix running suture, subdermal layer closed with a 2-0 Stratafix running suture and the skin closed with 3-0 Quill. Steri-Strips, as well as sterile dressings, an ACE bandage and ice pack were placed. There were no complications. Sponge counts correct. Tourniquet time 41 minutes. The patient was taken to PAR in stable condition.     DRAINS  None    SPECIMENS  None    COMPLICATIONS  None    ESTIMATED BLOOD LOSS  25 mL    CONDITION ON DISCHARGE FROM OR  Satisfactory    PLAN  1. Antibiotic Prophylaxis was given included Clindamycin 900mg. Antibiotics given  within 1 hour of surgical incision and discontinued within 24 hrs.   2. DVT prophylaxis ASA given within 24 hours    3. Weight Bearing WBAT (Weight bearing as tolerated).   4. Discharge anticipated date POD #1 to Home   5. Pain Medication oxycodone, Tylenol and Celebrex  6. Change dressing on POD #1. Discharge with AG dressing.    BURKE KRISHNAMURTHY MD      @C(1)@ [facility] - [phone]

## 2022-02-15 NOTE — CONSULTS
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 Rick Kemp. No immediate postoperative complications, spinal anesthesia with abductor canal block, EBL 25mL. Preoperative H&P reviewed.    Vital signs:  Temp: 98.2  F (36.8  C) Temp src: Temporal BP: 106/59 Pulse: 75   Resp: 11 SpO2: 100 % O2 Device: Nasal cannula Oxygen Delivery: 2 LPM Height: 165.1 cm (5' 5\") Weight: 76 kg (167 lb 8 oz)  Estimated body mass index is 27.87 kg/m  as calculated from the following:    Height as of this encounter: 1.651 m (5' 5\").    Weight as of this encounter: 76 kg (167 lb 8 oz).    Diet: Advance Diet as Tolerated: Regular Diet Adult    DVT Prophylaxis: Defer to primary service  Sewell Catheter: Not present  Code Status: Full Code    Plan:  - Routine postoperative cares per primary service, including IVF, pain control, abx, DVT ppx, and disposition  - PT/OT as per primary service  - Aggressive pulmonary toilet, frequent IS use 10x/hour while awake  - Discharge medication reconciliation completed. Hold estrogen/progesterone for at least 2 weeks until ambulating regularly.  - Defer formal consultation. Hospitalist will sign off.  - *Please reconsult if medical issues arise that require Hospitalist assistance    No charge note.    SHERLEY Mojica, PA-C  Hospitalist JOANNE  [facility]  Securely message with the Vocera Web Console (learn more here)  Text page via [employer] Paging/Directory      "

## 2022-02-15 NOTE — ANESTHESIA PROCEDURE NOTES
Adductor canal Procedure Note    Pre-Procedure   Staff -        Anesthesiologist:  Kemal Cameron MD       Performed By: anesthesiologist       Location: pre-op       Pre-Anesthestic Checklist: patient identified, IV checked, site marked, risks and benefits discussed, informed consent, monitors and equipment checked, pre-op evaluation, at physician/surgeon's request and post-op pain management  Timeout:       Correct Patient: Yes        Correct Procedure: Yes        Correct Site: Yes        Correct Position: Yes        Correct Laterality: Yes   Procedure Documentation  Procedure: Adductor canal       Laterality: right       Patient Position: supine       Patient Prep/Sterile Barriers: sterile gloves, mask       Skin prep: Chloraprep      Local skin infiltrated with mL of 1% lidocaine.        Needle Type: insulated and short bevel       Needle Gauge: 21.        Needle Length (millimeters): 100        Ultrasound guided       1. Ultrasound was used to identify targeted nerve, plexus, vascular marker, or fascial plane and place a needle adjacent to it in real-time.       2. Ultrasound was used to visualize the spread of anesthetic in close proximity to the above referenced structure.       3. A permanent image is entered into the patient's record.       4. The visualized anatomic structures appeared normal.       5. There were no apparent abnormal pathologic findings.    Assessment/Narrative         The placement was negative for: blood aspirated, painful injection and site bleeding       Paresthesias: No.     Bolus given via needle..        Secured via.        Insertion/Infusion Method: Single Shot       Complications: none       Injection made incrementally with aspirations every 5 mL.    Medication(s) Administered   Ropivacaine 0.5% w/ 1:400K Epi (Injection), 15 mL

## 2022-02-15 NOTE — OR NURSING
OK by PA Cameron to transfer to the floor. Three belonging bags returned to pt.  Faizan called with update.

## 2022-02-16 ENCOUNTER — APPOINTMENT (OUTPATIENT)
Dept: OCCUPATIONAL THERAPY | Facility: CLINIC | Age: 52
End: 2022-02-16
Attending: ORTHOPAEDIC SURGERY
Payer: COMMERCIAL

## 2022-02-16 ENCOUNTER — APPOINTMENT (OUTPATIENT)
Dept: PHYSICAL THERAPY | Facility: CLINIC | Age: 52
End: 2022-02-16
Attending: ORTHOPAEDIC SURGERY
Payer: COMMERCIAL

## 2022-02-16 VITALS
OXYGEN SATURATION: 97 % | HEART RATE: 71 BPM | WEIGHT: 167.5 LBS | SYSTOLIC BLOOD PRESSURE: 105 MMHG | RESPIRATION RATE: 16 BRPM | DIASTOLIC BLOOD PRESSURE: 59 MMHG | TEMPERATURE: 98.3 F | BODY MASS INDEX: 27.91 KG/M2 | HEIGHT: 65 IN

## 2022-02-16 LAB
FASTING STATUS PATIENT QL REPORTED: NO
GLUCOSE BLD-MCNC: 92 MG/DL (ref 70–99)
HGB BLD-MCNC: 11.3 G/DL (ref 11.7–15.7)

## 2022-02-16 PROCEDURE — 97165 OT EVAL LOW COMPLEX 30 MIN: CPT | Mod: GO | Performed by: OCCUPATIONAL THERAPIST

## 2022-02-16 PROCEDURE — 97535 SELF CARE MNGMENT TRAINING: CPT | Mod: GO | Performed by: OCCUPATIONAL THERAPIST

## 2022-02-16 PROCEDURE — 250N000013 HC RX MED GY IP 250 OP 250 PS 637: Performed by: PHYSICIAN ASSISTANT

## 2022-02-16 PROCEDURE — 97110 THERAPEUTIC EXERCISES: CPT | Mod: GP

## 2022-02-16 PROCEDURE — 82947 ASSAY GLUCOSE BLOOD QUANT: CPT | Performed by: ORTHOPAEDIC SURGERY

## 2022-02-16 PROCEDURE — 85018 HEMOGLOBIN: CPT | Performed by: PHYSICIAN ASSISTANT

## 2022-02-16 PROCEDURE — 250N000011 HC RX IP 250 OP 636: Performed by: PHYSICIAN ASSISTANT

## 2022-02-16 PROCEDURE — 36415 COLL VENOUS BLD VENIPUNCTURE: CPT | Performed by: PHYSICIAN ASSISTANT

## 2022-02-16 PROCEDURE — 97116 GAIT TRAINING THERAPY: CPT | Mod: GP

## 2022-02-16 RX ADMIN — ASPIRIN 162 MG: 81 TABLET, COATED ORAL at 08:10

## 2022-02-16 RX ADMIN — HYDROXYZINE HYDROCHLORIDE 25 MG: 25 TABLET ORAL at 04:49

## 2022-02-16 RX ADMIN — CELECOXIB 100 MG: 100 CAPSULE ORAL at 08:10

## 2022-02-16 RX ADMIN — HYDROXYZINE HYDROCHLORIDE 25 MG: 25 TABLET ORAL at 11:11

## 2022-02-16 RX ADMIN — ONDANSETRON 4 MG: 2 INJECTION INTRAMUSCULAR; INTRAVENOUS at 08:33

## 2022-02-16 RX ADMIN — SENNOSIDES AND DOCUSATE SODIUM 1 TABLET: 50; 8.6 TABLET ORAL at 08:10

## 2022-02-16 RX ADMIN — OXYCODONE HYDROCHLORIDE 10 MG: 5 TABLET ORAL at 00:45

## 2022-02-16 RX ADMIN — HYDROMORPHONE HYDROCHLORIDE 0.4 MG: 0.2 INJECTION, SOLUTION INTRAMUSCULAR; INTRAVENOUS; SUBCUTANEOUS at 00:09

## 2022-02-16 RX ADMIN — OXYCODONE HYDROCHLORIDE 10 MG: 5 TABLET ORAL at 08:57

## 2022-02-16 RX ADMIN — CLINDAMYCIN PHOSPHATE 600 MG: 600 INJECTION, SOLUTION INTRAVENOUS at 04:42

## 2022-02-16 RX ADMIN — OXYCODONE HYDROCHLORIDE 10 MG: 5 TABLET ORAL at 04:49

## 2022-02-16 RX ADMIN — HYDROMORPHONE HYDROCHLORIDE 0.4 MG: 0.2 INJECTION, SOLUTION INTRAMUSCULAR; INTRAVENOUS; SUBCUTANEOUS at 02:15

## 2022-02-16 RX ADMIN — ACETAMINOPHEN 975 MG: 325 TABLET, FILM COATED ORAL at 04:49

## 2022-02-16 NOTE — PROGRESS NOTES
Patient vital signs are at baseline: Yes  Patient able to ambulate as they were prior to admission or with assist devices provided by therapies during their stay:  Yes  Patient MUST void prior to discharge:  Yes  Patient able to tolerate oral intake:  Yes  Pain has adequate pain control using Oral analgesics:  Yes    A&O x4.   CMS Intact.   VSS on RA.   Up with Ax1 GB and walker.   Voiding in BR or BC.   Pain controlled with Oxycodone, Tylenol, Atarax.   Continue to monitor.

## 2022-02-16 NOTE — PROGRESS NOTES
"ORTHOPEDIC LOWER EXTREMITY PROGRESS NOTE    POD#1  Patient is a 51 year old female who underwent Procedure(s):  RIGHT OPEN TOTAL KNEE ARTHROPLASTY; hardware removal on 2/15/2022. Pain is controlled. Tolerating medication well, no nausea or vomiting.  is here with her.  No complaints this morning, overall doing well.    Vitals:   Blood pressure 105/59, pulse 71, temperature 98.3  F (36.8  C), temperature source Oral, resp. rate 16, height 1.651 m (5' 5\"), weight 76 kg (167 lb 8 oz), last menstrual period 03/15/2016, SpO2 97 %.  Temp (24hrs), Av  F (36.7  C), Min:97  F (36.1  C), Max:98.5  F (36.9  C)      Drains: None    EXAM   The patient is awake and alert.  Calves are soft and non-tender.   Sensation is intact.  Dorsiflexion and plantar flexion is intact.  Foot warm with nl cap refill.  The incision is covered with AG dressing.     Labs:   Recent Labs   Lab Test 22  0719 17  0555 17  0645   HGB 11.3* 11.1* 10.9*       ASSESSMENT  S/p R TKA   PLAN  1. DVT prophylaxis: aspirin  2. Weight Bearing: WBAT (Weight bearing as tolerated).  3. Anticipated discharge date today . Discharge to Home with Outpatient Treatment.  4. Cont Pain Control Oxycodone, Tylenol and Celebrex    Gissell Eisenberg PA-C  [facility]        "

## 2022-02-16 NOTE — PROGRESS NOTES
Reviewed discharge instructions and medications with patient and spouse. Questions answered. Patient discharged to home with   spouse, discharge instructions, medications, and belongings.    Delay in discharge r/t nausea in morning.

## 2022-02-16 NOTE — PROGRESS NOTES
[facility]      OUTPATIENT OCCUPATIONAL THERAPY  EVALUATION  PLAN OF TREATMENT FOR OUTPATIENT REHABILITATION  (COMPLETE FOR INITIAL CLAIMS ONLY)  Patient's Last Name, First Name, M.I.  YOB: 1970  Chanelle Allen                          Provider's Name  [facility] Medical Record No.  [id number]                               Onset Date:  02/15/22   Start of Care Date:  02/16/22     Type:     ___PT   _X_OT   ___SLP Medical Diagnosis:  R TKA                        OT Diagnosis:  Decline in ADl performance   Visits from SOC:  1   _________________________________________________________________________________  Plan of Treatment/Functional Goals    Planned Interventions: ADL retraining   Goals: See Occupational Therapy Goals on Care Plan in [employer] electronic health record.    Therapy Frequency: One time eval and treatment  Predicted Duration of Therapy Intervention: 02/16/22  _________________________________________________________________________________    I CERTIFY THE NEED FOR THESE SERVICES FURNISHED UNDER        THIS PLAN OF TREATMENT AND WHILE UNDER MY CARE     (Physician co-signature of this document indicates review and certification of the therapy plan).                Certification date from: 02/16/22, Certification date to: 02/17/22    [facility] Phys[facility]            Initial Assessment        See Occupational Therapy evaluation dated 02/16/22 in Epic electronic health record.

## 2022-02-16 NOTE — PLAN OF CARE
Physical Therapy Discharge Summary    Reason for therapy discharge:    All goals and outcomes met, no further needs identified.    Progress towards therapy goal(s). See goals on Care Plan in [facility] electronic health record for goal details.  Goals met    Therapy recommendation(s):    Continued therapy is recommended.  Rationale/Recommendations:  pt will benefit from outpt PT to progress her gait and right knee strength/ROM. .  Continue home exercise program.

## 2022-02-16 NOTE — PROGRESS NOTES
Patient a/o X4, vitals stable. On room air when awake, she did need 2L of O2 nasal cannula when falling asleep. CMS intact. Pain controlled with oxycodone, atarax, and two doses of IV dilaudid. Patient is up with assist of 1/walker, voiding. Plan to discharge home today.

## 2022-02-16 NOTE — PLAN OF CARE
[facility]      OUTPATIENT PHYSICAL THERAPY EVALUATION  PLAN OF TREATMENT FOR OUTPATIENT REHABILITATION  (COMPLETE FOR INITIAL CLAIMS ONLY)  Patient's Last Name, First Name, M.I.  YOB: 1970  Chanelle Allen                        Provider's Name  [facility] Medical Record No.  [id number]                               Onset Date:  02/15/22   Start of Care Date:  02/15/22      Type:     _X_PT   ___OT   ___SLP Medical Diagnosis:  Right TKA                         PT Diagnosis:  impaired gait   Visits from SOC:  1   _________________________________________________________________________________  Plan of Treatment/Functional Goals    Planned Interventions: balance training,bed mobility training,cryotherapy,gait training,home exercise program,patient/family education,ROM (range of motion),stair training,strengthening,stretching,transfer training,progressive activity/exercise     Goals: See Physical Therapy Goals on Care Plan in [employer] electronic health record.    Therapy Frequency: 2x/day  Predicted Duration of Therapy Intervention: 2 days  _________________________________________________________________________________    I CERTIFY THE NEED FOR THESE SERVICES FURNISHED UNDER        THIS PLAN OF TREATMENT AND WHILE UNDER MY CARE     (Physician co-signature of this document indicates review and certification of the therapy plan).              Certification date from: 02/15/22, Certification date to: 02/22/22    Referring Physician: Tone Campbell PA-C            Initial Assessment        See Physical Therapy evaluation dated 02/15/22 in Epic electronic health record.

## 2022-02-16 NOTE — PROGRESS NOTES
Occupational Therapy Discharge Summary    Reason for therapy discharge:    Discharged to home.  All goals and outcomes met, no further needs identified.    Progress towards therapy goal(s). See goals on Care Plan in [facility] electronic health record for goal details.  Goals met    Therapy recommendation(s):    No further therapy is recommended.

## 2022-02-16 NOTE — PLAN OF CARE
Goal Outcome Evaluation:    Plan of Care Reviewed With: patient     Patient vital signs are at baseline: Yes  Patient able to ambulate as they were prior to admission or with assist devices provided by therapies during their stay:  Yes  Patient MUST void prior to discharge:  Yes  Patient able to tolerate oral intake:  Yes  Pain has adequate pain control using Oral analgesics:  Yes    A/Ox4, up with SBA/GB/walker, aquacel dressing applied, ice to operative site, PRN IV Zofran x1 for C/O nausea, patient denies difficulties with voiding, SL removed, patient controlled with PRN Atarax and Oxycodone with scheduled Tylenol and Celebrex, discharge instructions reviewed with patient and spouse, discharge medications dispensed to patient, and patient will discharge home after 1130 OT.

## 2022-02-16 NOTE — PROGRESS NOTES
02/16/22 1100   Quick Adds   Type of Visit Initial Occupational Therapy Evaluation   Living Environment   People in Home spouse   Current Living Arrangements house   Home Accessibility stairs to enter home;stairs within home   Number of Stairs, Main Entrance 1   Number of Stairs, Within Home, Primary   (stairs to basement--does not need to access)   Stair Railings, Within Home, Primary railings safe and in good condition   Transportation Anticipated family or friend will provide   Living Environment Comments Pt's mother lives at home, will be around 24/7 for assist    Self-Care   Usual Activity Tolerance good   Current Activity Tolerance moderate   Regular Exercise Yes   Activity/Exercise Type strength training;walking   Equipment Currently Used at Home none   Fall history within last six months yes   Number of times patient has fallen within last six months 1  (fell on ice getting out of truck)   Activity/Exercise/Self-Care Comment pt was previously ind with all mobility, ADL's and IADL's    General Information   Onset of Illness/Injury or Date of Surgery 02/15/22   [facility] Phys[facility]  [facility]   Patient/Family Therapy Goal Statement (OT) Plans to discharge home later today   Additional Occupational Profile Info/Pertinent History of Current Problem Pt. underwent a R TKA   Performance Patterns (Routines, Roles, Habits) pt. active, indep. at baseline   Existing Precautions/Restrictions fall   Right Lower Extremity (Weight-bearing Status) weight-bearing as tolerated (WBAT)   General Observations and Info Pt. feeling much better, nauseated earllier this moring, spouse present;pt. agreeable to OT;pt. reports she had a L TKA in 2017   Cognitive Status Examination   Orientation Status orientation to person, place and time   Cognitive Status Comments intact per observation/conversation   Visual Perception   Impact of Vision Impairment on Function (Vision) no visin prblems note/reported    Sensory   Sensory Comments no numbness/tingling reported   Pain Assessment   Patient Currently in Pain Yes, see Vital Sign flowsheet  (5-6/10)   Range of Motion Comprehensive   General Range of Motion no range of motion deficits identified   Strength Comprehensive (MMT)   General Manual Muscle Testing (MMT) Assessment no strength deficits identified   Coordination   Upper Extremity Coordination No deficits were identified   Bed Mobility   Comment (Bed Mobility) indep.   Balance   Balance Comments good balance for room mobility using WW/supervision   Lower Body Dressing Assessment/Training   [place] Level (Lower Body Dressing) supervision  (SBA)   Grooming Assessment/Training   [place] Level (Grooming) supervision   Toileting   [place] Level (Toileting) supervision  (SBA)   Clinical Impression   Criteria for Skilled Therapeutic Interventions Met (OT) Yes, treatment indicated   OT Diagnosis Decline in ADl performance   OT Problem List-Impairments impacting ADL problems related to;balance;flexibility;mobility;range of motion (ROM);strength;pain  (decreased LE strength/ROM)   Assessment of Occupational Performance 3-5 Performance Deficits   Identified Performance Deficits dressing, toileting, bathing, grooming, mobility + IADl's   Planned Therapy Interventions (OT) ADL retraining   Clinical Decision Making Complexity (OT) low complexity   Anticipated Equipment Needs Upon Discharge (OT) shower chair   Risk & Benefits of therapy have been explained evaluation/treatment results reviewed;care plan/treatment goals reviewed;risks/benefits reviewed;current/potential barriers reviewed;participants voiced agreement with care plan;participants included;patient;spouse/significant other   OT Discharge Planning   OT Rationale for DC Rec OT:Pt. is moving very well w/ WW/supervision;pt. able to complete safe transfers + ADlL's w/ supervision /indep. Pt. able to complete toilet transfer w/ supervision, standing  ADL's/grooming at sink w/ supervision, LE dressing w/ set-up/supervision for standing;pt. demo. good flexibilty for reaching feet to aaron shoes/socks--no AE used/needed;Pt. has support of spouse at home, has met IP OT goals.   (has reacher, comfort height toilet+ walk-in shower)   Therapy Certification   Start of Care Date 02/16/22   Certification date from 02/16/22   Certification date to 02/17/22   Medical Diagnosis R TKA   Total Evaluation Time (Minutes)   Total Evaluation Time (Minutes) 7   OT Goals   Therapy Frequency (OT) One time eval and treatment   OT Predicated Duration/Target Date for Goal Attainment 02/16/22   OT: Hygiene/Grooming supervision/stand-by assist   OT: Lower Body Dressing Supervision/stand-by assist   OT: Toilet Transfer/Toileting Supervision/stand-by assist

## 2022-02-19 ENCOUNTER — HEALTH MAINTENANCE LETTER (OUTPATIENT)
Age: 52
End: 2022-02-19

## 2022-10-22 ENCOUNTER — HEALTH MAINTENANCE LETTER (OUTPATIENT)
Age: 52
End: 2022-10-22

## 2023-01-27 ENCOUNTER — HOSPITAL ENCOUNTER (OUTPATIENT)
Dept: MAMMOGRAPHY | Facility: CLINIC | Age: 53
Discharge: HOME | End: 2023-01-27
Admitting: FAMILY MEDICINE
Payer: COMMERCIAL

## 2023-01-27 DIAGNOSIS — Z12.31 VISIT FOR SCREENING MAMMOGRAM: ICD-10-CM

## 2023-01-27 PROCEDURE — 77067 SCR MAMMO BI INCL CAD: CPT

## 2023-04-01 ENCOUNTER — HEALTH MAINTENANCE LETTER (OUTPATIENT)
Age: 53
End: 2023-04-01

## 2024-04-01 ENCOUNTER — HOSPITAL ENCOUNTER (OUTPATIENT)
Dept: MAMMOGRAPHY | Facility: CLINIC | Age: 54
Discharge: HOME | End: 2024-04-01
Attending: FAMILY MEDICINE | Admitting: FAMILY MEDICINE
Payer: COMMERCIAL

## 2024-04-01 DIAGNOSIS — Z12.31 VISIT FOR SCREENING MAMMOGRAM: ICD-10-CM

## 2024-04-01 PROCEDURE — 77063 BREAST TOMOSYNTHESIS BI: CPT

## 2024-06-02 ENCOUNTER — HEALTH MAINTENANCE LETTER (OUTPATIENT)
Age: 54
End: 2024-06-02

## 2025-06-14 ENCOUNTER — HEALTH MAINTENANCE LETTER (OUTPATIENT)
Age: 55
End: 2025-06-14

## (undated) DEVICE — GLOVE PROTEXIS MICRO 8.0  2D73PM80

## (undated) DEVICE — BLADE SAW SAGITTAL STRK 25X79.5X1.24MM 4/2000 2108-318-000

## (undated) DEVICE — SU VICRYL 0 CTX CR 8X18" J764D

## (undated) DEVICE — IMM KNEE 20" 79-80020

## (undated) DEVICE — GLOVE PROTEXIS POWDER FREE 8.5 ORTHOPEDIC 2D73ET85

## (undated) DEVICE — Device

## (undated) DEVICE — SYR 50ML LL W/O NDL 309653

## (undated) DEVICE — HOOD FLYTE W/PEELAWAY 408-800-100

## (undated) DEVICE — BLADE SAW SAGITTAL STRK 18X90X1.27MM HD SYS 6 6118-127-090

## (undated) DEVICE — IMM KNEE 20" 0814-2660

## (undated) DEVICE — NDL SPINAL 18GA 3.5" 405184

## (undated) DEVICE — WRAP EZY KNEE

## (undated) DEVICE — BLADE SAW SAGITTAL STRK 21X90X1.27MM HD SYS 6 6221-127-090

## (undated) DEVICE — SU PDO 1 STRATAFIX 36X36CM CTX TAPERPOINT SXPD2B405

## (undated) DEVICE — SUCTION IRR SYSTEM W/O TIP INTERPULSE HANDPIECE 0210-100-000

## (undated) DEVICE — CAST PADDING 6" UNSTERILE 9046

## (undated) DEVICE — CAST PADDING 6" STERILE 9046S

## (undated) DEVICE — GLOVE PROTEXIS W/NEU-THERA 8.5  2D73TE85

## (undated) DEVICE — SOL WATER IRRIG 1000ML BOTTLE 2F7114

## (undated) DEVICE — BONE CLEANING TIP INTERPULSE  0210-010-000

## (undated) DEVICE — ESU GROUND PAD UNIVERSAL W/O CORD

## (undated) DEVICE — CAST PADDING 4" UNSTERILE 9044

## (undated) DEVICE — SU VICRYL 2-0 CP-1 27" UND J266H

## (undated) DEVICE — DRSG KERLIX FLUFFS X5

## (undated) DEVICE — DRSG GAUZE 4X4" 3033

## (undated) DEVICE — SOL NACL 0.9% IRRIG 3000ML BAG 2B7477

## (undated) DEVICE — GLOVE PROTEXIS W/NEU-THERA 8.0  2D73TE80

## (undated) DEVICE — GLOVE PROTEXIS POWDER FREE 7.5 ORTHOPEDIC 2D73ET75

## (undated) DEVICE — DRSG STERI STRIP 1/2X4" R1547

## (undated) DEVICE — SYR 30ML LL W/O NDL

## (undated) DEVICE — PACK TOTAL KNEE SOP15TKFSD

## (undated) DEVICE — PREP CHLORAPREP 26ML TINTED ORANGE  260815

## (undated) DEVICE — DRSG XEROFORM 5X9" 8884431605

## (undated) DEVICE — DRAPE ARTHROSCOPY 120X92" 9414

## (undated) DEVICE — BONE CEMENT MIXEVAC III HI VAC KIT  0206-015-000

## (undated) DEVICE — DRAPE STERI U 1015

## (undated) DEVICE — BLADE CLIPPER 4412A

## (undated) DEVICE — LINEN TOWEL PACK X5 5464

## (undated) DEVICE — SU STRATAFIX PDS PLUS 0 CT 45CM SXPP1A406

## (undated) DEVICE — DRSG ADAPTIC 3X8" 6113

## (undated) DEVICE — PREP DURAPREP 26ML APL 8630

## (undated) DEVICE — DRAIN ROUND W/RESERV KIT JACKSON PRATT 10FR 400ML SU130-402D

## (undated) DEVICE — MANIFOLD NEPTUNE 4 PORT 700-20

## (undated) DEVICE — SOLUTION WOUND CLEANSING 3/4OZ 10% PVP EA-L3011FB-50

## (undated) DEVICE — GLOVE PROTEXIS BLUE W/NEU-THERA 7.5  2D73EB75

## (undated) DEVICE — GLOVE PROTEXIS POWDER FREE 8.0 ORTHOPEDIC 2D73ET80

## (undated) DEVICE — DRAPE IOBAN INCISE 23X17" 6650EZ

## (undated) DEVICE — SU ETHIBOND 0 CTX CR  8X18" CX31D

## (undated) DEVICE — SYR 30ML LL W/O NDL 302832

## (undated) DEVICE — DRSG ABDOMINAL 07 1/2X8" 7197D

## (undated) RX ORDER — KETOROLAC TROMETHAMINE 30 MG/ML
INJECTION, SOLUTION INTRAMUSCULAR; INTRAVENOUS
Status: DISPENSED
Start: 2017-06-23

## (undated) RX ORDER — ONDANSETRON 2 MG/ML
INJECTION INTRAMUSCULAR; INTRAVENOUS
Status: DISPENSED
Start: 2022-02-15

## (undated) RX ORDER — ONDANSETRON 2 MG/ML
INJECTION INTRAMUSCULAR; INTRAVENOUS
Status: DISPENSED
Start: 2017-06-23

## (undated) RX ORDER — DIPHENHYDRAMINE HYDROCHLORIDE 50 MG/ML
INJECTION INTRAMUSCULAR; INTRAVENOUS
Status: DISPENSED
Start: 2017-06-23

## (undated) RX ORDER — TRANEXAMIC ACID 650 MG/1
TABLET ORAL
Status: DISPENSED
Start: 2022-02-15

## (undated) RX ORDER — FENTANYL CITRATE 50 UG/ML
INJECTION, SOLUTION INTRAMUSCULAR; INTRAVENOUS
Status: DISPENSED
Start: 2017-06-23

## (undated) RX ORDER — BUPIVACAINE HYDROCHLORIDE AND EPINEPHRINE 2.5; 5 MG/ML; UG/ML
INJECTION, SOLUTION EPIDURAL; INFILTRATION; INTRACAUDAL; PERINEURAL
Status: DISPENSED
Start: 2017-06-23

## (undated) RX ORDER — CLINDAMYCIN PHOSPHATE 900 MG/50ML
INJECTION, SOLUTION INTRAVENOUS
Status: DISPENSED
Start: 2022-02-15

## (undated) RX ORDER — OXYCODONE HCL 10 MG/1
TABLET, FILM COATED, EXTENDED RELEASE ORAL
Status: DISPENSED
Start: 2017-06-23

## (undated) RX ORDER — HYDROMORPHONE HCL IN WATER/PF 6 MG/30 ML
PATIENT CONTROLLED ANALGESIA SYRINGE INTRAVENOUS
Status: DISPENSED
Start: 2022-02-15

## (undated) RX ORDER — MORPHINE SULFATE 1 MG/ML
INJECTION, SOLUTION EPIDURAL; INTRATHECAL; INTRAVENOUS
Status: DISPENSED
Start: 2017-06-23

## (undated) RX ORDER — LIDOCAINE HYDROCHLORIDE 20 MG/ML
INJECTION, SOLUTION EPIDURAL; INFILTRATION; INTRACAUDAL; PERINEURAL
Status: DISPENSED
Start: 2022-02-15

## (undated) RX ORDER — ACETAMINOPHEN 500 MG
TABLET ORAL
Status: DISPENSED
Start: 2022-02-15

## (undated) RX ORDER — HYDROMORPHONE HYDROCHLORIDE 1 MG/ML
INJECTION, SOLUTION INTRAMUSCULAR; INTRAVENOUS; SUBCUTANEOUS
Status: DISPENSED
Start: 2017-06-23

## (undated) RX ORDER — ACETAMINOPHEN 500 MG
TABLET ORAL
Status: DISPENSED
Start: 2017-06-23

## (undated) RX ORDER — PROPOFOL 10 MG/ML
INJECTION, EMULSION INTRAVENOUS
Status: DISPENSED
Start: 2017-06-23

## (undated) RX ORDER — DEXAMETHASONE SODIUM PHOSPHATE 4 MG/ML
INJECTION, SOLUTION INTRA-ARTICULAR; INTRALESIONAL; INTRAMUSCULAR; INTRAVENOUS; SOFT TISSUE
Status: DISPENSED
Start: 2017-06-23

## (undated) RX ORDER — LIDOCAINE HYDROCHLORIDE 20 MG/ML
INJECTION, SOLUTION EPIDURAL; INFILTRATION; INTRACAUDAL; PERINEURAL
Status: DISPENSED
Start: 2017-06-23

## (undated) RX ORDER — VANCOMYCIN HYDROCHLORIDE 1 G/20ML
INJECTION, POWDER, LYOPHILIZED, FOR SOLUTION INTRAVENOUS
Status: DISPENSED
Start: 2022-02-15

## (undated) RX ORDER — CLINDAMYCIN PHOSPHATE 900 MG/50ML
INJECTION, SOLUTION INTRAVENOUS
Status: DISPENSED
Start: 2017-06-23

## (undated) RX ORDER — HYDROXYZINE HYDROCHLORIDE 50 MG/ML
INJECTION, SOLUTION INTRAMUSCULAR
Status: DISPENSED
Start: 2017-06-23

## (undated) RX ORDER — ALBUMIN, HUMAN INJ 5% 5 %
SOLUTION INTRAVENOUS
Status: DISPENSED
Start: 2022-02-15